# Patient Record
Sex: MALE
[De-identification: names, ages, dates, MRNs, and addresses within clinical notes are randomized per-mention and may not be internally consistent; named-entity substitution may affect disease eponyms.]

---

## 2022-07-13 ENCOUNTER — HOSPITAL ENCOUNTER (INPATIENT)
Dept: HOSPITAL 5 - ED | Age: 67
LOS: 5 days | Discharge: HOME | DRG: 100 | End: 2022-07-18
Attending: INTERNAL MEDICINE | Admitting: STUDENT IN AN ORGANIZED HEALTH CARE EDUCATION/TRAINING PROGRAM
Payer: COMMERCIAL

## 2022-07-13 DIAGNOSIS — E78.00: ICD-10-CM

## 2022-07-13 DIAGNOSIS — E78.5: ICD-10-CM

## 2022-07-13 DIAGNOSIS — E87.6: ICD-10-CM
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
## 2022-07-13 DIAGNOSIS — F10.939: ICD-10-CM

## 2022-07-13 DIAGNOSIS — E83.42: ICD-10-CM

## 2022-07-13 DIAGNOSIS — E83.51: ICD-10-CM

## 2022-07-13 DIAGNOSIS — R74.01: ICD-10-CM

## 2022-07-13 DIAGNOSIS — E43: ICD-10-CM

## 2022-07-13 DIAGNOSIS — G40.909: Primary | ICD-10-CM
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
## 2022-07-13 LAB
ALBUMIN SERPL-MCNC: 3.5 G/DL (ref 3.9–5)
ALT SERPL-CCNC: 78 UNITS/L (ref 7–56)
ANISOCYTOSIS BLD QL SMEAR: (no result)
BAND NEUTROPHILS # (MANUAL): 0 K/MM3
BILIRUB UR QL STRIP: (no result)
BLOOD UR QL VISUAL: (no result)
BUN SERPL-MCNC: 7 MG/DL (ref 9–20)
BUN/CREAT SERPL: 6 %
CALCIUM SERPL-MCNC: 8.1 MG/DL (ref 8.4–10.2)
HCT VFR BLD CALC: 36.2 % (ref 35.5–45.6)
HEMOLYSIS INDEX: 4
HGB BLD-MCNC: 12 GM/DL (ref 11.8–15.2)
MCHC RBC AUTO-ENTMCNC: 33 % (ref 32–34)
MCV RBC AUTO: 98 FL (ref 84–94)
MYELOCYTES # (MANUAL): 0 K/MM3
PH UR STRIP: 6 [PH] (ref 5–7)
PLATELET # BLD: 240 K/MM3 (ref 140–440)
PROMYELOCYTES # (MANUAL): 0 K/MM3
RBC # BLD AUTO: 3.69 M/MM3 (ref 3.65–5.03)
RBC #/AREA URNS HPF: < 1 /HPF (ref 0–6)
TOTAL CELLS COUNTED BLD: 100
UROBILINOGEN UR-MCNC: 2 MG/DL (ref ?–2)
WBC #/AREA URNS HPF: 2 /HPF (ref 0–6)

## 2022-07-13 PROCEDURE — 85007 BL SMEAR W/DIFF WBC COUNT: CPT

## 2022-07-13 PROCEDURE — 84132 ASSAY OF SERUM POTASSIUM: CPT

## 2022-07-13 PROCEDURE — G0480 DRUG TEST DEF 1-7 CLASSES: HCPCS

## 2022-07-13 PROCEDURE — 81001 URINALYSIS AUTO W/SCOPE: CPT

## 2022-07-13 PROCEDURE — 83735 ASSAY OF MAGNESIUM: CPT

## 2022-07-13 PROCEDURE — 80053 COMPREHEN METABOLIC PANEL: CPT

## 2022-07-13 PROCEDURE — 70450 CT HEAD/BRAIN W/O DYE: CPT

## 2022-07-13 PROCEDURE — 70491 CT SOFT TISSUE NECK W/DYE: CPT

## 2022-07-13 PROCEDURE — 80307 DRUG TEST PRSMV CHEM ANLYZR: CPT

## 2022-07-13 PROCEDURE — 82962 GLUCOSE BLOOD TEST: CPT

## 2022-07-13 PROCEDURE — 80320 DRUG SCREEN QUANTALCOHOLS: CPT

## 2022-07-13 PROCEDURE — 87040 BLOOD CULTURE FOR BACTERIA: CPT

## 2022-07-13 PROCEDURE — 80048 BASIC METABOLIC PNL TOTAL CA: CPT

## 2022-07-13 PROCEDURE — 76700 US EXAM ABDOM COMPLETE: CPT

## 2022-07-13 PROCEDURE — 84443 ASSAY THYROID STIM HORMONE: CPT

## 2022-07-13 PROCEDURE — 71045 X-RAY EXAM CHEST 1 VIEW: CPT

## 2022-07-13 PROCEDURE — 36415 COLL VENOUS BLD VENIPUNCTURE: CPT

## 2022-07-13 PROCEDURE — 85025 COMPLETE CBC W/AUTO DIFF WBC: CPT

## 2022-07-13 PROCEDURE — 90715 TDAP VACCINE 7 YRS/> IM: CPT

## 2022-07-13 PROCEDURE — 93005 ELECTROCARDIOGRAM TRACING: CPT

## 2022-07-13 PROCEDURE — 84100 ASSAY OF PHOSPHORUS: CPT

## 2022-07-13 NOTE — EMERGENCY DEPARTMENT REPORT
ED Seizure HPI





- General


Chief Complaint: Seizure


Stated Complaint: SEIZURE


Time Seen by Provider: 07/13/22 21:55


Source: patient, EMS


Mode of arrival: Stretcher


Limitations: No Limitations





- History of Present Illness


Initial Comments: 





66-year-old male who lives in [place], originating from [place], with self-

reported history of hypercholesterolemia, hypertension, peripheral vascular 

disease, brought in by EMS from the airport for evaluation status post seizure. 

EMS reports that the patient had a witnessed seizure by multiple other 

passengers who were boarding the patient's flight.  They state that the patient 

was having what she believes was a grand mal seizure.  Upon their arrival the 

patient was not seizing but was found to be confused and per her report, 

postictal.  She states approximately 30 minutes later the patient's mental statu

s significantly improved and he was transported to the hospital without 

incident.  He did not require any antiepileptics. Pt denies any recent or active

covid symptoms.





Patient reports he drinks alcohol chronically and for the past year and a half 

has been drinking "only 1 beer a week."  He reports he had a beer at the airport

while waiting for his flight.  He states he was traveling from [facility] to [place] and had to stop in [place] when the seizure happened.  He 

states he was feeling "fine" before having a seizure and does not recall having 

any prior symptoms or having any preceding symptoms before the seizure.  He 

denies any chest pain shortness of breath difficulty breathing or palpitations. 

No headache.  No weakness in his arms or legs.  Denies any tobacco alcohol or i

llicit drugs.  Pain currently 0/10.


MD Complaint: seizure


-: Gradual


Description of Episode: other (unknown)


Witnessed:: Yes


Trauma: No


Seizure History: none


Place: other (airport)


Possible Precipitating Event: alcohol withdrawal


Associated Symptoms: denies other symptoms


Treatments Prior to Arrival: none





- Related Data


                                    Allergies











Allergy/AdvReac Type Severity Reaction Status Date / Time


 


dactinomycin Allergy  Rash Verified 07/13/22 22:13














ED Review of Systems


ROS: 


Stated complaint: SEIZURE


Other details as noted in HPI





Comment: All other systems reviewed and negative


Constitutional: no symptoms reported


Eyes: as per HPI


ENT: as per HPI


Respiratory: no symptoms reported


Cardiovascular: as per HPI


Endocrine: no symptoms reported


Gastrointestinal: denies: abdominal pain, nausea, vomiting, diarrhea, 

constipation, hematemesis, melena, hematochezia


Genitourinary: denies: as per HPI, urgency, dysuria, frequency, hematuria, 

discharge, testicular pain, testicular mass, other


Musculoskeletal: denies: as per HPI, back pain, joint swelling, arthralgia


Skin: denies: as per HPI, rash, lesions, change in color, change in hair/nails, 

pruritus


Neurological: other (+seizure)


Psychiatric: denies: anxiety, depression, auditory hallucinations, visual 

hallucinations, homicidal thoughts, suicidal thoughts


Hematological/Lymphatic: denies: easy bleeding, easy bruising, swollen glands





ED Past Medical Hx





- Past Medical History


Previous Medical History?: Yes


Hx Hypertension: Yes


Hx Seizures: No





- Surgical History


Past Surgical History?: Yes


Additional Surgical History: vascular surgery to LLE





- Family History


Family history: no significant





- Social History


Smoking Status: Unknown if ever smoked


Substance Use Type: Alcohol





ED Physical Exam





- General


Limitations: No Limitations


General appearance: alert, in no apparent distress





- Head


Head exam: Present: atraumatic, normocephalic, normal inspection





- Eye


Eye exam: Present: normal appearance, PERRL, EOMI, other (Sclerae anicteric)





- ENT


ENT exam: Present: normal exam, normal orophraynx, mucous membranes moist, other

(Significant tongue fasciculations noted)





- Neck


Neck exam: Present: normal inspection, full ROM.  Absent: tenderness, 

meningismus, lymphadenopathy, thyromegaly





- Respiratory


Respiratory exam: Absent: normal lung sounds bilaterally, wheezes, rales, 

rhonchi, stridor, chest wall tenderness, accessory muscle use, decreased breath 

sounds, prolonged expiratory





- Cardiovascular


Cardiovascular Exam: Present: regular rate, normal rhythm, normal heart sounds





- GI/Abdominal


GI/Abdominal exam: Present: soft, normal bowel sounds.  Absent: distended, 

tenderness, guarding, rigid, diminished bowel sounds, hyperactive bowel sounds, 

hypoactive bowel sounds, organomegaly, mass, bruit, pulsatile mass, hernia





- Rectal


Rectal exam: Present: deferred





- 


External exam: Present: normal external exam, other (skin grafting scar to 

distal anterior LLE; no evidence of acute trauma or infection, scar site is 

healed dry and intact, intact distal pulses in bilateral lower extremity)





- Extremities Exam


Extremities exam: Present: full ROM, normal capillary refill.  Absent: pedal 

edema, joint swelling, calf tenderness





- Back Exam


Back exam: Present: normal inspection, full ROM.  Absent: tenderness, CVA 

tenderness (R), CVA tenderness (L), muscle spasm, paraspinal tenderness, 

vertebral tenderness





- Neurological Exam


Neurological exam: Present: alert, oriented X3, CN II-XII intact, normal gait, 

motor sensory deficit, reflexes normal





- Psychiatric


Psychiatric exam: Present: normal affect, normal mood.  Absent: depressed, 

anxious, flat affect, manic, homicidal ideation, suicidal ideation





- Skin


Skin exam: Present: warm, dry, intact, normal color





ED Course


                                   Vital Signs











  07/13/22 07/13/22 07/13/22





  21:36 22:01 22:07


 


Temperature 98 F  97.7 F


 


Pulse Rate 74 75 73


 


Respiratory 16 12 17





Rate   


 


Blood Pressure   156/77


 


Blood Pressure 131/86  156/77





[Right]   


 


O2 Sat by Pulse 98 100 100





Oximetry   














  07/13/22 07/13/22 07/13/22





  22:16 22:30 22:47


 


Temperature   


 


Pulse Rate 73 73 86


 


Respiratory 21 20 





Rate   


 


Blood Pressure 156/77 156/77 156/77


 


Blood Pressure   





[Right]   


 


O2 Sat by Pulse 100 100 84





Oximetry   














  07/13/22 07/13/22 07/13/22





  23:00 23:16 23:30


 


Temperature   


 


Pulse Rate 70 71 73


 


Respiratory 21 16 19





Rate   


 


Blood Pressure 156/77 156/77 156/77


 


Blood Pressure   





[Right]   


 


O2 Sat by Pulse 99 99 98





Oximetry   














  07/13/22 07/14/22 07/14/22





  23:46 00:00 00:16


 


Temperature   


 


Pulse Rate 72 72 86


 


Respiratory 15 21 19





Rate   


 


Blood Pressure 156/77 156/77 156/77


 


Blood Pressure   





[Right]   


 


O2 Sat by Pulse 94 98 98





Oximetry   














  07/14/22 07/14/22 07/14/22





  00:30 00:46 01:00


 


Temperature   


 


Pulse Rate 76 83 88


 


Respiratory 23 23 16





Rate   


 


Blood Pressure 156/77 156/77 156/77


 


Blood Pressure   





[Right]   


 


O2 Sat by Pulse 98 99 97





Oximetry   














  07/14/22 07/14/22 07/14/22





  01:16 01:30 01:46


 


Temperature   


 


Pulse Rate 82 72 80


 


Respiratory 32 H 24 23





Rate   


 


Blood Pressure 156/77 156/77 156/77


 


Blood Pressure   





[Right]   


 


O2 Sat by Pulse 100 100 98





Oximetry   














  07/14/22 07/14/22 07/14/22





  02:00 02:16 02:30


 


Temperature   


 


Pulse Rate 87  73


 


Respiratory 18 18 25 H





Rate   


 


Blood Pressure 156/77 156/77 138/71


 


Blood Pressure   





[Right]   


 


O2 Sat by Pulse 99 99 99





Oximetry   














  07/14/22 07/14/22 07/14/22





  02:46 03:00 03:16


 


Temperature   


 


Pulse Rate 70 78 76


 


Respiratory 22 17 24





Rate   


 


Blood Pressure 119/62 138/71 159/75


 


Blood Pressure   





[Right]   


 


O2 Sat by Pulse  97 





Oximetry   














  07/14/22 07/14/22 07/14/22





  03:30 03:46 04:00


 


Temperature   


 


Pulse Rate 86  70


 


Respiratory 23  26 H





Rate   


 


Blood Pressure 156/72 141/89 141/89


 


Blood Pressure   





[Right]   


 


O2 Sat by Pulse 95 100 100





Oximetry   














  07/14/22 07/14/22 07/14/22





  04:16 04:30 05:02


 


Temperature   


 


Pulse Rate 70 72 


 


Respiratory 26 H 22 





Rate   


 


Blood Pressure 156/78 155/86 144/64


 


Blood Pressure   





[Right]   


 


O2 Sat by Pulse 99 99 





Oximetry   














  07/14/22 07/14/22 07/14/22





  05:17 05:32 05:56


 


Temperature   


 


Pulse Rate   74


 


Respiratory   21





Rate   


 


Blood Pressure 121/55 125/65 125/65


 


Blood Pressure   





[Right]   


 


O2 Sat by Pulse   98





Oximetry   














  07/14/22





  06:00


 


Temperature 98.0 F


 


Pulse Rate 88


 


Respiratory 18





Rate 


 


Blood Pressure 


 


Blood Pressure 169/96





[Right] 


 


O2 Sat by Pulse 98





Oximetry 














- Reevaluation(s)


Reevaluation #1: 





07/13/22 23:12


pt well appearing; mentating well; no focal neurodeficits, patient is not posti

ctal and has had no recurrent seizure episodes, denies any symptoms, pain 0-10





ED Medical Decision Making





- Lab Data


Result diagrams: 


                                 07/13/22 22:28





                                 07/14/22 04:06





- EKG Data


-: EKG Interpreted by Me


EKG shows normal: sinus rhythm


Rate: normal





- EKG Data


When compared to previous EKG there are: previous EKG unavailable


Interpretation: no acute changes, normal EKG





- Medical Decision Making





66-year-old male with a history of chronic alcohol abuse brought in from the 

airport for witnessed seizure activity.  Vital signs stable.  Patient tremulous 

here with tongue fasciculations.  Although the patient repeatedly denies he 

actively abuses alcohol, he was given Ativan 1 mg IV push which resulted in 

complete resolution of his tremulousness.  While here the patient's nurse states

 that the patient had a witnessed fall while attempting to urinate.  The patient

 at the time sustained in a superficial abrasion to the right anterior aspect of

 his neck.  CT scan of the neck was performed to rule out cervical spine 

fracture as well as vascular pathology.  See patient's chart for radiologist 

impression and plan.





Patient is well-appearing here.  No further emergent work-up warranted.  Patient

 stable per my clinical assessment for discharge to home.


Critical care attestation.: 


If time is entered above; I have spent that time in minutes in the direct care 

of this critically ill patient, excluding procedure time.








ED Disposition


Clinical Impression: 


 Seizure, Neck abrasion, non-infected





Disposition: 01 HOME / SELF CARE / HOMELESS


Is pt being admited?: No


Does the pt Need Aspirin: No


Condition: Stable


Additional Instructions: 


The cause of your seizure is unclear but it may be related to alcohol intake.  

It is strongly advised that you discontinue drinking alcohol as it places you at

 risk for life-threatening health conditions.





It is strongly advised that you do not travel internationally at this time but 

rather return home as soon as possible to be evaluated by your primary care 

doctor.  You are not cleared for return to traveling internationally at this 

time given that your seizure will need further work-up by your primary care 

doctor and/or neurologist as soon as possible.





Please follow-up with your primary care doctor soon as possible for 

reassessment.  Your primary care doctor will likely need to refer you to a 

neurologist.





Apply over-the-counter bacitracin ointment to the abrasion on your neck.  Please

 do this twice a day for the next 7 to 10 days








Return to the nearest emergency department as soon as possible if you develop 

recurrent seizures, dizziness, lightheadedness, weakness, or if any other new 

worrisome symptoms develop.


Referrals: 


PRIMARY CARE,MD [Primary Care Provider] - 3-5 Days

## 2022-07-13 NOTE — CAT SCAN REPORT
CT head without contrast



INDICATION : witnessed seizure at airport; pt denies hx of seiz.



TECHNIQUE:  Axial imaging performed from the skull apex through the skull base without the use of con
trast.  All CT scans at this location are performed using CT dose reduction for ALARA by means of aut
omated exposure control. 



COMPARISON:  None



FINDINGS:  



Parenchyma: No mass, stroke or hemorrhage. Chronic changes of atrophy and small vessel ischemia. Lacu
samantha infarct right thalamus.



Ventricles:  Ventricles are normal in size and appear symmetric.   



Soft tissues:  Soft tissues including the orbits appear normal.   



Bones:  No acute osseous abnormality.   



Sinuses:  Bilateral mucosal thickening at the maxillary sinuses.



IMPRESSION:

1. Chronic changes of atrophy and small vessel ischemia.

2. Small lacunar infarct right thalamus is favored to be chronic.

3. Mild mucosal thickening at the maxillary sinuses.



Signer Name: Werner Low MD 

Signed: 7/13/2022 11:01 PM

Workstation Name: [id number]

## 2022-07-14 LAB
BUN SERPL-MCNC: 6 MG/DL (ref 9–20)
BUN/CREAT SERPL: 6 %
CALCIUM SERPL-MCNC: 8.1 MG/DL (ref 8.4–10.2)
HEMOLYSIS INDEX: 4

## 2022-07-14 RX ADMIN — DEXTROSE SCH MLS/HR: 5 SOLUTION INTRAVENOUS at 17:50

## 2022-07-14 RX ADMIN — Medication SCH ML: at 10:31

## 2022-07-14 RX ADMIN — ENOXAPARIN SODIUM SCH MG: 100 INJECTION SUBCUTANEOUS at 09:49

## 2022-07-14 RX ADMIN — Medication SCH ML: at 23:02

## 2022-07-14 NOTE — CAT SCAN REPORT
CT NECK WITH INTRAVENOUS CONTRAST AND MULTIPLANAR RECONSTRUCTION



CLINICAL HISTORY: fall, injury and lac to R anterior neck; r/o vasc 



TECHNIQUE:



2.5 mm thick contiguous axial scans were obtained from the skull base down to the aortic arch during 
intravenous contrast administration. In addition to evaluation of axial source images sagittal and co
arpita multiplanar reconstructions were produced and reviewed for this report.



CONTRAST DOSE REPORT: Omnipaque 350:  100 administered intravenously.



All CT imaging studies performed at this facility utilize dose modulation, iterative reconstruction o
r weight based dosing, if appropriate, to obtain the lowest achievable radiation dose.





FINDINGS:



AIRWAY: No abnormalities are seen along the course of the airway. Nasopharynx, oropharynx, hypopharyn
x, larynx and visualized portions of the subglottic airway all have an unremarkable appearance.



LYMPH NODES: There is no indication of cervical lymphadenopathy.



ORAL CAVITY/FLOOR OF MOUTH: No abnormalities are seen in evaluation of the oral cavity and tongue. Th
e floor the mouth has a normal appearance.



MAJOR SALIVARY GLANDS: The parotid and submandibular salivary glands have a normal appearance.



NASAL CAVITY AND PARANASAL SINUSES: Inflammatory changes are present at the basis of both maxillary s
inuses. Paranasal sinuses otherwise appear clear. Frontal sinuses did not develop in this individual.




ORBITS:No abnormalities of the visualized portions of the orbits are identified. Globes, optic nerves
, extraocular muscles and lacrimal glands have an unremarkable appearance.



THYROID GLAND: The thyroid gland is normal in size and homogeneous in attenuation. No focal thyroid l
esions are identified.



TEMPORAL BONES:Mastoid air cells are normally pneumatized.



CRANIOCERVICAL JUNCTION:No significant abnormality.



CERVICAL SPINE: Normal alignment is maintained throughout. There is no indication of traumatic sublux
ation. There is no evidence of fracture or bone destruction. Loss of disc height is noted at the C4-5
, C5-6 and C6-7 levels. Disc vacuum phenomena is present at C5-6 and C6-7 levels. Facet and uncoverte
bral arthritic changes are noted. Neuroforaminal stenosis is prominent finding at the C5, C6 and C7 n
erve root levels.



LUNG APICES: Evaluation of the lung apices reveals no abnormality. There is no indication of lung nod
ule or infiltrate. The visualized portions of the superior mediastinum have an unremarkable appearanc
e.



VASCULAR STRUCTURES: Combination of soft and calcified atherosclerotic plaque is present at the left 
carotid bifurcation without indication of hemodynamically significant stenosis. There is no evidence 
of vascular injury. Evaluation of the left subclavian artery is limited secondary to dense contrast i
n the adjacent left subclavian vein and innominate pain.



CONTRAST ADMINISTRATION: Enhancement of normal vascular structures is demonstrated. No areas of abnor
mal contrast enhancement are identified. 



IMPRESSION:





1. Widespread cervical spondylosis with multifocal neuroforaminal narrowing. No indication of cervica
l fracture or traumatic subluxation.

2. No indication of major vascular injury.



Signer Name: Isaiah Lazar MD 

Signed: 7/14/2022 5:18 AM

Workstation Name: [id number]

## 2022-07-14 NOTE — ELECTROCARDIOGRAPH REPORT
[facility]

                                       

Test Date:    2022               Test Time:    23:03:57

Pat Name:     SONIA BEYER       Department:   

Patient ID:   [id number]          Room:         [facility]

Gender:       M                        Technician:   GALEN

:          1955               Requested By: NIHARIKA MCKEON

Order Number: [id number]              Reading MD:   Rigo Loera

                                 Measurements

Intervals                              Axis          

Rate:         75                       P:            55

SD:           163                      QRS:          8

QRSD:         67                       T:            29

QT:           424                                    

QTc:          475                                    

                           Interpretive Statements

Sinus rhythm

LAE, consider biatrial enlargement

nonspecific st-t

No previous ECG available for comparison

Electronically Signed On 2022 9:18:57 EDT by Rigo Loera

## 2022-07-14 NOTE — XRAY REPORT
CHEST 1 VIEW 7/13/2022 11:07 PM



INDICATION / CLINICAL INFORMATION: seizure, weakness.



COMPARISON: None.



FINDINGS:



SUPPORT DEVICES: None.

HEART / MEDIASTINUM: No significant abnormality. 

LUNGS / PLEURA: No significant pulmonary or pleural abnormality. No pneumothorax. 



ADDITIONAL FINDINGS: No significant additional findings.



IMPRESSION: No acute abnormality.



Signer Name: Werner Low MD 

Signed: 7/14/2022 12:10 AM

Workstation Name: [id number]
#1:

## 2022-07-14 NOTE — HISTORY AND PHYSICAL REPORT
History of Present Illness


Date of examination: 07/14/22


Date of admission: 


07/14/22 08:16





Chief complaint: 





Witnessed seizure


History of present illness: 





Unable to get a complete history from the patient given his 

encephalopathy/lethargy.  The HPI listed below is from the ED 

physician/provider.








"The patient is a 66-year-old male who lives in [place], originating from 

[place], with self-reported history of hypercholesterolemia, hypertension, 

peripheral vascular disease, brought in by EMS from the airport for evaluation 

status post seizure.  EMS reports that the patient had a witnessed seizure by 

multiple other passengers who were boarding the patient's flight.  They state 

that the patient was having what she believes was a grand mal seizure.  Upon the

ir arrival the patient was not seizing but was found to be confused and per her 

report, postictal.  She states approximately 30 minutes later the patient's 

mental status significantly improved and he was transported to the hospital 

without incident.  He did not require any antiepileptics. Pt denies any recent 

or active covid symptoms.





Patient reports he drinks alcohol chronically and for the past year and a half 

has been drinking "only 1 beer a week."  He reports he had a beer at the airport

while waiting for his flight.  He states he was traveling from [facility] to [place] and had to stop in [place] when the seizure happened.  He 

states he was feeling "fine" before having a seizure and does not recall having 

any prior symptoms or having any preceding symptoms before the seizure.  He 

denies any chest pain shortness of breath difficulty breathing or palpitations. 

No headache.  No weakness in his arms or legs.  Denies any tobacco alcohol or 

illicit drugs.  Pain currently 0/10."





The patient is being admitted for management of alcohol withdrawal.





Past History


Past Medical History: hypertension, hyperlipidemia, PVD


Past Surgical History: No surgical history


Social history: alcohol abuse, full code


Family history: other (Unable to obtain information from patient)





Medications and Allergies


                                    Allergies











Allergy/AdvReac Type Severity Reaction Status Date / Time


 


dactinomycin Allergy  Rash Verified 07/13/22 22:13











Active Meds: 


Active Medications





Acetaminophen (Acetaminophen 325 Mg Tab)  650 mg PO Q4H PRN


   PRN Reason: Pain MILD(1-3)/Fever >100.5/HA


Chlordiazepoxide HCl (Chlordiazepoxide 25 Mg Cap)  50 mg PO Q3HR PRN


   PRN Reason: CIWA-Ar 8-15


   Last Admin: 07/14/22 14:21 Dose:  50 mg


   


Enoxaparin Sodium (Enoxaparin 40 Mg/0.4 Ml Inj)  40 mg SUB-Q QDAY@1000 SHIRA


   Last Admin: 07/14/22 09:49 Dose:  40 mg


   


Thiamine HCl 100 mg/ Folic Acid 1 mg/ Multivitamins/Minerals 10 ml/ Sodium Chlo

ride  1,011.2 mls @ 250 mls/hr IV ONCE ONE


   Stop: 07/14/22 15:02


   Last Admin: 07/14/22 12:09 Dose:  250 mls/hr


   


Sodium Chloride (Nacl 0.9% 1000 Ml)  1,000 mls @ 500 mls/hr IV BOLUS [facility]


   Stop: 07/14/22 23:00


Lorazepam (Lorazepam 2 Mg/Ml Vial)  4 mg IV Q15MIN PRN


   PRN Reason: CIWA-Ar >25


Lorazepam (Lorazepam 2 Mg/Ml Vial)  2 mg IV Q1HR PRN


   PRN Reason: CIWA-Ar 16-25


   Last Admin: 07/14/22 10:32 Dose:  2 mg


   


Morphine Sulfate (Morphine 2 Mg/1 Ml Inj)  2 mg IV Q4H PRN


   PRN Reason: Pain , Severe (7-10)


Ondansetron HCl (Ondansetron 4 Mg/2 Ml Inj)  4 mg IV Q8H PRN


   PRN Reason: Nausea And Vomiting


Oxycodone/Acetaminophen (Oxycodone /Acetaminophen 5-325mg Tab)  1 tab PO Q6H PRN


   PRN Reason: Pain, Moderate (4-6)


Sodium Chloride (Sodium Chloride 0.9% 10 Ml Flush Syringe)  10 ml IV BID [facility]


   Last Admin: 07/14/22 10:31 Dose:  10 ml


   


Sodium Chloride (Sodium Chloride 0.9% 10 Ml Flush Syringe)  10 ml IV PRN PRN


   PRN Reason: LINE FLUSH











Review of Systems


ROS unobtainable: due to mental status





Exam





- Constitutional


Vitals: 


                                        











Temp Pulse Resp BP Pulse Ox


 


 98.9 F   70   24   148/101   100 


 


 07/14/22 08:30  07/14/22 14:00  07/14/22 14:00  07/14/22 14:00  07/14/22 14:00











General appearance: Present: no acute distress, well-nourished, other 

(Drowsy/lethargic on exam)





- EENT


Eyes: Present: PERRL, EOM intact


ENT: hearing intact, clear oral mucosa, dentition normal





- Neck


Neck: Present: supple, normal ROM, other (Laceration of right neck (medial 

aspect))





- Respiratory


Respiratory effort: normal


Respiratory: bilateral: CTA





- Cardiovascular


Rhythm: regular


Heart Sounds: Present: S1 & S2





- Extremities


Extremities: no ischemia, pulses intact, pulses symmetrical, No edema, normal 

temperature, normal color, Full ROM


Peripheral Pulses: within normal limits





- Abdominal


General gastrointestinal: Present: soft, non-tender, distended (Mildly 

distended), normal bowel sounds


Male genitourinary: Present: deferred





- Rectal


Rectal Exam: deferred





- Integumentary


Integumentary: Present: clear (Spider angiomas on anterior upper chest), warm, 

dry





- Musculoskeletal


Musculoskeletal: strength equal bilaterally





- Psychiatric


Psychiatric: other (Unable to fully examine/assess given mental status)





- Neurologic


Neurologic: CNII-XII intact, moves all extremities





Results





- Labs


CBC & Chem 7: 


                                 07/13/22 22:28





                                 07/14/22 04:06


Labs: 


                             Laboratory Last Values











WBC  5.1 K/mm3 (4.5-11.0)   07/13/22  22:28    


 


RBC  3.69 M/mm3 (3.65-5.03)   07/13/22  22:28    


 


Hgb  12.0 gm/dl (11.8-15.2)   07/13/22  22:28    


 


Hct  36.2 % (35.5-45.6)   07/13/22  22:28    


 


MCV  98 fl (84-94)  H  07/13/22  22:28    


 


MCH  33 pg (28-32)  H  07/13/22  22:28    


 


MCHC  33 % (32-34)   07/13/22  22:28    


 


RDW  14.6 % (13.2-15.2)   07/13/22  22:28    


 


Plt Count  240 K/mm3 (140-440)   07/13/22  22:28    


 


Mono % (Auto)  Np   07/13/22  22:28    


 


Add Manual Diff  Complete   07/13/22  22:28    


 


Total Counted  100   07/13/22  22:28    


 


Seg Neuts % (Manual)  74.0 % (40.0-70.0)  H  07/13/22  22:28    


 


Band Neutrophils %  0 %  07/13/22  22:28    


 


Lymphocytes % (Manual)  17.0 % (13.4-35.0)   07/13/22  22:28    


 


Reactive Lymphs % (Man)  0 %  07/13/22  22:28    


 


Monocytes % (Manual)  8.0 % (0.0-7.3)  H  07/13/22  22:28    


 


Eosinophils % (Manual)  1.0 % (0.0-4.3)   07/13/22  22:28    


 


Basophils % (Manual)  0 % (0.0-1.8)   07/13/22  22:28    


 


Metamyelocytes %  0 %  07/13/22  22:28    


 


Myelocytes %  0 %  07/13/22 22:28    


 


Promyelocytes %  0 %  07/13/22  22:28    


 


Blast Cells %  0 %  07/13/22  22:28    


 


Nucleated RBC %  Not Reportable   07/13/22  22:28    


 


Seg Neutrophils # Man  3.8 K/mm3 (1.8-7.7)   07/13/22  22:28    


 


Band Neutrophils #  0.0 K/mm3  07/13/22  22:28    


 


Lymphocytes # (Manual)  0.9 K/mm3 (1.2-5.4)  L  07/13/22  22:28    


 


Abs React Lymphs (Man)  0.0 K/mm3  07/13/22  22:28    


 


Monocytes # (Manual)  0.4 K/mm3 (0.0-0.8)   07/13/22  22:28    


 


Eosinophils # (Manual)  0.1 K/mm3 (0.0-0.4)   07/13/22  22:28    


 


Basophils # (Manual)  0.0 K/mm3 (0.0-0.1)   07/13/22  22:28    


 


Metamyelocytes #  0.0 K/mm3  07/13/22  22:28    


 


Myelocytes #  0.0 K/mm3  07/13/22  22:28    


 


Promyelocytes #  0.0 K/mm3  07/13/22  22:28    


 


Blast Cells #  0.0 K/mm3  07/13/22  22:28    


 


WBC Morphology  Not Reportable   07/13/22  22:28    


 


Hypersegmented Neuts  Not Reportable   07/13/22  22:28    


 


Hyposegmented Neuts  Not Reportable   07/13/22  22:28    


 


Hypogranular Neuts  Not Reportable   07/13/22  22:28    


 


Smudge Cells  Few   07/13/22  22:28    


 


Toxic Granulation  Not Reportable   07/13/22  22:28    


 


Toxic Vacuolation  Not Reportable   07/13/22  22:28    


 


Dohle Bodies  Not Reportable   07/13/22  22:28    


 


Pelger-Huet Anomaly  Not Reportable   07/13/22  22:28    


 


Titus Rods  Not Reportable   07/13/22  22:28    


 


Platelet Estimate  Consistent w auto   07/13/22  22:28    


 


Clumped Platelets  Not Reportable   07/13/22  22:28    


 


Plt Clumps, EDTA  Not Reportable   07/13/22  22:28    


 


Large Platelets  Not Reportable   07/13/22  22:28    


 


Giant Platelets  Not Reportable   07/13/22  22:28    


 


Platelet Satelliting  Not Reportable   07/13/22  22:28    


 


Plt Morphology Comment  Not Reportable   07/13/22  22:28    


 


RBC Morphology  Not Reportable   07/13/22  22:28    


 


Dimorphic RBCs  Not Reportable   07/13/22  22:28    


 


Polychromasia  Not Reportable   07/13/22  22:28    


 


Hypochromasia  Not Reportable   07/13/22  22:28    


 


Poikilocytosis  Not Reportable   07/13/22  22:28    


 


Anisocytosis  1+   07/13/22  22:28    


 


Microcytosis  Not Reportable   07/13/22  22:28    


 


Macrocytosis  Not Reportable   07/13/22  22:28    


 


Spherocytes  Not Reportable   07/13/22  22:28    


 


Pappenheimer Bodies  Not Reportable   07/13/22  22:28    


 


Sickle Cells  Not Reportable   07/13/22  22:28    


 


Target Cells  Not Reportable   07/13/22  22:28    


 


Tear Drop Cells  Not Reportable   07/13/22  22:28    


 


Ovalocytes  Not Reportable   07/13/22  22:28    


 


Helmet Cells  Not Reportable   07/13/22  22:28    


 


Mcmahon-[place] Bodies  Not Reportable   07/13/22  22:28    


 


Cabot Rings  Not Reportable   07/13/22  22:28    


 


[place] Cells  Not Reportable   07/13/22  22:28    


 


Bite Cells  Not Reportable   07/13/22  22:28    


 


Crenated Cell  Not Reportable   07/13/22  22:28    


 


Elliptocytes  Not Reportable   07/13/22  22:28    


 


Acanthocytes (Spur)  Not Reportable   07/13/22  22:28    


 


Rouleaux  Not Reportable   07/13/22  22:28    


 


Hemoglobin C Crystals  Not Reportable   07/13/22  22:28    


 


Schistocytes  Not Reportable   07/13/22  22:28    


 


Malaria parasites  Not Reportable   07/13/22  22:28    


 


Marino Bodies  Not Reportable   07/13/22  22:28    


 


Hem Pathologist Commnt  No   07/13/22  22:28    


 


Sodium  134 mmol/L (137-145)  L  07/14/22  04:06    


 


Potassium  3.6 mmol/L (3.6-5.0)   07/14/22  04:06    


 


Chloride  95.3 mmol/L ()  L  07/14/22  04:06    


 


Carbon Dioxide  24 mmol/L (22-30)   07/14/22  04:06    


 


Anion Gap  18 mmol/L  07/14/22  04:06    


 


BUN  6 mg/dL (9-20)  L  07/14/22  04:06    


 


Creatinine  1.0 mg/dL (0.8-1.3)   07/14/22  04:06    


 


Estimated GFR  > 60 ml/min  07/14/22  04:06    


 


BUN/Creatinine Ratio  6 %  07/14/22  04:06    


 


Glucose  101 mg/dL ()  H  07/14/22  04:06    


 


Calcium  8.1 mg/dL (8.4-10.2)  L  07/14/22  04:06    


 


Phosphorus  1.30 mg/dL (2.5-4.5)  L  07/14/22  09:02    


 


Magnesium  1.30 mg/dL (1.7-2.3)  L  07/13/22  22:28    


 


Total Bilirubin  0.80 mg/dL (0.1-1.2)   07/13/22  22:28    


 


AST  156 units/L (5-40)  H  07/13/22  22:28    


 


ALT  78 units/L (7-56)  H  07/13/22  22:28    


 


Alkaline Phosphatase  145 units/L ()  H  07/13/22  22:28    


 


Total Protein  7.6 g/dL (6.3-8.2)   07/13/22  22:28    


 


Albumin  3.5 g/dL (3.9-5)  L  07/13/22  22:28    


 


Albumin/Globulin Ratio  0.9 %  07/13/22  22:28    


 


TSH  2.020 mlU/mL (0.270-4.200)   07/13/22  22:28    


 


Urine Color  Yellow  (Yellow)   07/13/22  Unknown


 


Urine Turbidity  Clear  (Clear)   07/13/22  Unknown


 


Urine pH  6.0  (5.0-7.0)   07/13/22  Unknown


 


Ur Specific Gravity  1.012  (1.003-1.030)   07/13/22  Unknown


 


Urine Protein  30 mg/dl mg/dL (Negative)   07/13/22  Unknown


 


Urine Glucose (UA)  Neg mg/dL (Negative)   07/13/22  Unknown


 


Urine Ketones  Tr mg/dL (Negative)   07/13/22  Unknown


 


Urine Blood  Neg  (Negative)   07/13/22  Unknown


 


Urine Nitrite  Neg  (Negative)   07/13/22  Unknown


 


Urine Bilirubin  Neg  (Negative)   07/13/22  Unknown


 


Urine Urobilinogen  2.0 mg/dL (<2.0)   07/13/22  Unknown


 


Ur Leukocyte Esterase  Neg  (Negative)   07/13/22  Unknown


 


Urine WBC (Auto)  2.0 /HPF (0.0-6.0)   07/13/22  Unknown


 


Urine RBC (Auto)  < 1.0 /HPF (0.0-6.0)   07/13/22  Unknown


 


U Epithel Cells (Auto)  < 1.0 /HPF (0-13.0)   07/13/22  Unknown


 


Urine Opiates Screen  Negative   07/13/22  Unknown


 


Urine Methadone Screen  Negative   07/13/22  Unknown


 


Ur Barbiturates Screen  Negative   07/13/22  Unknown


 


Ur Phencyclidine Scrn  Negative   07/13/22  Unknown


 


Ur Amphetamines Screen  Negative   07/13/22  Unknown


 


U Benzodiazepines Scrn  Negative   07/13/22  Unknown


 


Urine Cocaine Screen  Negative   07/13/22  Unknown


 


U Marijuana (THC) Screen  Negative   07/13/22  Unknown


 


Drugs of Abuse Note  Disclamer   07/13/22  Unknown


 


Plasma/Serum Alcohol  < 0.01 % (0-0.07)   07/13/22  22:28    














Assessment and Plan


Assessment and plan: 








#Alcohol withdrawal


#Witnessed seizure


 Patient endorses chronic alcohol consumption and currently admits to having "1

beer a day".  Seizure episode is likely secondary to alcohol withdrawal as 

opposed to a true seizure disorder.  Holding off on initiating antiepileptics.


 Initiating CIWA protocol


 Status post IV fluid resuscitation + banana bag in the ED


 We will start p.o. folic acid, thiamine, and multivitamin in the morning.





#Elevated transaminases


 , ALT 78, alkaline phosphatase 145


 Likely secondary to chronic alcohol consumption.  Pending abdominal ultrasound

to evaluate for possible cirrhosis given the patient's spider angiomas + 

abdominal distention that might be indicative for ascites.


 Continue to monitor with repeat CMP in the morning.





#Hypocalcemia


 Calcium 8.1


 Repleting.  Will repeat calcium level in the morning.





#Hypomagnesemia


 Magnesium 1.3


 Repleting.  Will repeat magnesium level in the morning.





#Mild protein caloric malnutrition


 Albumin 3.5


 We will start dietary supplementation when patient is more alert.





#Alcohol dependence


- Counseled patient on the importance of ETOH cessation. Assess patient's 

current ETOH consumption. Assisted with trying to arrange resources for patient 

to adequately work towards ETOH cessation. Patient expresses understanding. 


-Time: +15 mins





#Advanced care planning


-Disease education conducted, care plan discussed, diagnoses discussed, 

prognosis discussed, and patient acknowledges understanding with care plan


-Time: +30 min


Advance Directives: No


VTE prophylaxis?: Chemical


Plan of care discussed with patient/family: Yes

## 2022-07-14 NOTE — ULTRASOUND REPORT
ULTRASOUND ABDOMEN, COMPLETE



INDICATION / CLINICAL INFORMATION: Assess for possible cirrhosis.



COMPARISON: None available.



FINDINGS:

PANCREAS: No significant abnormality.

ABDOMINAL AORTA: No significant abnormality.

IVC: No significant abnormality.

LIVER: The liver is normal in size measuring 15.0 cm with diffusely echogenic and heterogeneous appea
daniel. Normal hepatopedal blood flow within the main portal vein.

GALLBLADDER: Hyperechoic focus in the gallbladder neck measuring approximately 9 mm could represent g
allbladder polyp versus non-shadowing stone. No significant wall thickening or pericholecystic fluid.
 

BILE DUCTS: No significant abnormality. Common bile duct measures 5 mm. 

KIDNEYS: Right: The right kidney measures 11.4 cm. No significant abnormality.  Left: The left kidney
 measures 11.1 cm. No significant abnormality.

SPLEEN: The spleen measures 10.8 cm. No significant abnormality.



FREE FLUID: None.

ADDITIONAL FINDINGS: None.



IMPRESSION:

1. Diffusely echogenic and heterogeneous appearance of the liver, most commonly seen with steatosis. 
 

2. Hyperechoic focus in the gallbladder neck measuring 9 mm could represent gallbladder polyp versus 
non-shadowing stone.



Scribed by: Elly Vizcaino RDMS, CHAPARRO, IMANI 

Scribed: 7/14/2022 2:27 PM 



 I have reviewed the images, agree with this report, and edited this report as needed.



Signer Name: Aaron Dan MD 

Signed: 7/14/2022 4:35 PM

Workstation Name: [employer]

## 2022-07-15 LAB
%HYPO/RBC NFR BLD AUTO: (no result) %
ALBUMIN SERPL-MCNC: 2.6 G/DL (ref 3.9–5)
ALT SERPL-CCNC: 56 UNITS/L (ref 7–56)
ANISOCYTOSIS BLD QL SMEAR: (no result)
BAND NEUTROPHILS # (MANUAL): 0 K/MM3
BUN SERPL-MCNC: 4 MG/DL (ref 9–20)
BUN/CREAT SERPL: 5 %
CALCIUM SERPL-MCNC: 7.6 MG/DL (ref 8.4–10.2)
HCT VFR BLD CALC: 32.7 % (ref 35.5–45.6)
HEMOLYSIS INDEX: 15
HGB BLD-MCNC: 11 GM/DL (ref 11.8–15.2)
MCHC RBC AUTO-ENTMCNC: 34 % (ref 32–34)
MCV RBC AUTO: 98 FL (ref 84–94)
MYELOCYTES # (MANUAL): 0 K/MM3
PLATELET # BLD: 205 K/MM3 (ref 140–440)
PROMYELOCYTES # (MANUAL): 0 K/MM3
RBC # BLD AUTO: 3.33 M/MM3 (ref 3.65–5.03)
TOTAL CELLS COUNTED BLD: 100

## 2022-07-15 RX ADMIN — Medication SCH MG: at 13:00

## 2022-07-15 RX ADMIN — FOLIC ACID SCH MG: 1 TABLET ORAL at 13:00

## 2022-07-15 RX ADMIN — ENOXAPARIN SODIUM SCH MG: 100 INJECTION SUBCUTANEOUS at 13:03

## 2022-07-15 RX ADMIN — Medication SCH ML: at 21:38

## 2022-07-15 RX ADMIN — Medication SCH ML: at 12:03

## 2022-07-15 RX ADMIN — MULTIVITAMIN TABLET SCH EACH: TABLET at 13:05

## 2022-07-15 NOTE — PROGRESS NOTE
Assessment and Plan


Assessment and plan: 


Severe hypokalemia;


Replenish per protocol and monitor levels





Hypomagnesemia; replenished per protocol monitor levels








#Alcohol withdrawal


#Witnessed seizure


 Patient endorses chronic alcohol consumption and currently admits to having "1

beer a day".  Seizure episode is likely secondary to alcohol withdrawal as 

opposed to a true seizure disorder.  Holding off on initiating antiepileptics.


 Initiating CIWA protocol


 Status post IV fluid resuscitation + banana bag in the ED


 We will start p.o. folic acid, thiamine, and multivitamin in the morning.





#Elevated transaminases


 , ALT 78, alkaline phosphatase 145


 Likely secondary to chronic alcohol consumption.  Pending abdominal ultrasound

to evaluate for possible cirrhosis given the patient's spider angiomas + 

abdominal distention that might be indicative for ascites.


 Continue to monitor with repeat CMP in the morning.





#Hypocalcemia


 Calcium 8.1


 Repleting.  Will repeat calcium level in the morning.





#Hypomagnesemia


 Magnesium 1.3


 Repleting.  Will repeat magnesium level in the morning.





#Mild protein caloric malnutrition


 Albumin 3.5


 We will start dietary supplementation when patient is more alert.





#Alcohol dependence


- Counseled patient on the importance of ETOH cessation. Assess patient's 

current ETOH consumption. Assisted with trying to arrange resources for patient 

to adequately work towards ETOH cessation. Patient expresses understanding. 


-Time: +15 mins





#Advanced care planning


-Disease education conducted, care plan discussed, diagnoses discussed, 

prognosis discussed, and patient acknowledges understanding with care plan


-Time: +30 min


Advance Directives: No


VTE prophylaxis?: Chemical


Plan of care discussed with patient/family: Yes





Closely monitor the patient and adjust management as needed


Plan of care reviewed with the patient and his nurse











History


Interval history: 


Seen and examined the patient at the bedside


Patient's chart and medications reviewed


Patient is confused and agitated restraint for safety


Vital signs noted








Hospitalist Physical





- Constitutional


Vitals: 


                                        











Temp Pulse Resp BP Pulse Ox


 


 98.3 F   68   18   151/68   96 


 


 07/15/22 03:19  07/15/22 03:19  07/15/22 05:03  07/15/22 03:19  07/15/22 05:03











General appearance: Present: no acute distress, well-nourished, other 

(Drowsy/lethargic on exam)





- EENT


Eyes: Present: PERRL, EOM intact





- Neck


Neck: Present: supple, normal ROM





- Respiratory


Respiratory effort: normal


Respiratory: bilateral: diminished, negative: rales, rhonchi, wheezing





- Cardiovascular


Rhythm: regular


Heart Sounds: Present: S1 & S2





- Extremities


Extremities: no ischemia, No edema





- Abdominal


General gastrointestinal: soft, non-tender, non-distended, normal bowel sounds





- Integumentary


Integumentary: Present: clear, warm





- Psychiatric


Psychiatric: other (Confused)





- Neurologic


Neurologic: moves all extremities





Results





- Labs


CBC & Chem 7: 


                                 07/15/22 04:39





                                 07/15/22 04:39


Labs: 


                             Laboratory Last Values











WBC  3.9 K/mm3 (4.5-11.0)  L  07/15/22  04:39    


 


RBC  3.33 M/mm3 (3.65-5.03)  L  07/15/22  04:39    


 


Hgb  11.0 gm/dl (11.8-15.2)  L  07/15/22  04:39    


 


Hct  32.7 % (35.5-45.6)  L  07/15/22  04:39    


 


MCV  98 fl (84-94)  H  07/15/22  04:39    


 


MCH  33 pg (28-32)  H  07/15/22  04:39    


 


MCHC  34 % (32-34)   07/15/22  04:39    


 


RDW  14.7 % (13.2-15.2)   07/15/22  04:39    


 


Plt Count  205 K/mm3 (140-440)   07/15/22  04:39    


 


Mono % (Auto)  Np   07/15/22  04:39    


 


Add Manual Diff  Complete   07/15/22  04:39    


 


Total Counted  100   07/15/22  04:39    


 


Seg Neuts % (Manual)  56.0 % (40.0-70.0)   07/15/22  04:39    


 


Band Neutrophils %  0 %  07/15/22  04:39    


 


Lymphocytes % (Manual)  27.0 % (13.4-35.0)   07/15/22  04:39    


 


Reactive Lymphs % (Man)  0 %  07/15/22  04:39    


 


Monocytes % (Manual)  17.0 % (0.0-7.3)  H  07/15/22  04:39    


 


Eosinophils % (Manual)  0 % (0.0-4.3)   07/15/22  04:39    


 


Basophils % (Manual)  0 % (0.0-1.8)   07/15/22  04:39    


 


Metamyelocytes %  0 %  07/15/22  04:39    


 


Myelocytes %  0 %  07/15/22  04:39    


 


Promyelocytes %  0 %  07/15/22  04:39    


 


Blast Cells %  0 %  07/15/22  04:39    


 


Nucleated RBC %  Not Reportable   07/15/22  04:39    


 


Seg Neutrophils # Man  2.2 K/mm3 (1.8-7.7)   07/15/22  04:39    


 


Band Neutrophils #  0.0 K/mm3  07/15/22  04:39    


 


Lymphocytes # (Manual)  1.1 K/mm3 (1.2-5.4)  L  07/15/22  04:39    


 


Abs React Lymphs (Man)  0.0 K/mm3  07/15/22  04:39    


 


Monocytes # (Manual)  0.7 K/mm3 (0.0-0.8)   07/15/22  04:39    


 


Eosinophils # (Manual)  0.0 K/mm3 (0.0-0.4)   07/15/22  04:39    


 


Basophils # (Manual)  0.0 K/mm3 (0.0-0.1)   07/15/22  04:39    


 


Metamyelocytes #  0.0 K/mm3  07/15/22  04:39    


 


Myelocytes #  0.0 K/mm3  07/15/22  04:39    


 


Promyelocytes #  0.0 K/mm3  07/15/22  04:39    


 


Blast Cells #  0.0 K/mm3  07/15/22  04:39    


 


WBC Morphology  Not Reportable   07/15/22  04:39    


 


Hypersegmented Neuts  Not Reportable   07/15/22  04:39    


 


Hyposegmented Neuts  Not Reportable   07/15/22  04:39    


 


Hypogranular Neuts  Not Reportable   07/15/22  04:39    


 


Smudge Cells  Not Reportable   07/15/22  04:39    


 


Toxic Granulation  Not Reportable   07/15/22  04:39    


 


Toxic Vacuolation  Not Reportable   07/15/22  04:39    


 


Dohle Bodies  Not Reportable   07/15/22  04:39    


 


Pelger-Huet Anomaly  Not Reportable   07/15/22  04:39    


 


Titus Rods  Not Reportable   07/15/22  04:39    


 


Platelet Estimate  Consistent w auto   07/15/22  04:39    


 


Clumped Platelets  Not Reportable   07/15/22  04:39    


 


Plt Clumps, EDTA  Not Reportable   07/15/22  04:39    


 


Large Platelets  Not Reportable   07/15/22  04:39    


 


Giant Platelets  Not Reportable   07/15/22  04:39    


 


Platelet Satelliting  Not Reportable   07/15/22  04:39    


 


Plt Morphology Comment  Not Reportable   07/15/22  04:39    


 


RBC Morphology  Not Reportable   07/15/22  04:39    


 


Dimorphic RBCs  Not Reportable   07/15/22  04:39    


 


Polychromasia  Not Reportable   07/15/22  04:39    


 


Hypochromasia  1+   07/15/22  04:39    


 


Poikilocytosis  Not Reportable   07/15/22  04:39    


 


Anisocytosis  1+   07/15/22  04:39    


 


Microcytosis  Not Reportable   07/15/22  04:39    


 


Macrocytosis  Few   07/15/22  04:39    


 


Spherocytes  Not Reportable   07/15/22  04:39    


 


Pappenheimer Bodies  Not Reportable   07/15/22  04:39    


 


Sickle Cells  Not Reportable   07/15/22  04:39    


 


Target Cells  Not Reportable   07/15/22  04:39    


 


Tear Drop Cells  Not Reportable   07/15/22  04:39    


 


Ovalocytes  Not Reportable   07/15/22  04:39    


 


Helmet Cells  Not Reportable   07/15/22  04:39    


 


Mcmahon-Friend Bodies  Not Reportable   07/15/22  04:39    


 


Cabot Rings  Not Reportable   07/15/22  04:39    


 


London Cells  Not Reportable   07/15/22  04:39    


 


Bite Cells  Not Reportable   07/15/22  04:39    


 


Crenated Cell  Not Reportable   07/15/22  04:39    


 


Elliptocytes  Not Reportable   07/15/22  04:39    


 


Acanthocytes (Spur)  Not Reportable   07/15/22  04:39    


 


Rouleaux  Not Reportable   07/15/22  04:39    


 


Hemoglobin C Crystals  Not Reportable   07/15/22  04:39    


 


Schistocytes  Not Reportable   07/15/22  04:39    


 


Malaria parasites  Not Reportable   07/15/22  04:39    


 


Marino Bodies  Not Reportable   07/15/22  04:39    


 


Hem Pathologist Commnt  No   07/15/22  04:39    


 


Sodium  143 mmol/L (137-145)  D 07/15/22  04:39    


 


Potassium  2.8 mmol/L (3.6-5.0)  L* D 07/15/22  04:39    


 


Chloride  105.3 mmol/L ()   07/15/22  04:39    


 


Carbon Dioxide  25 mmol/L (22-30)   07/15/22  04:39    


 


Anion Gap  16 mmol/L  07/15/22  04:39    


 


BUN  4 mg/dL (9-20)  L  07/15/22  04:39    


 


Creatinine  0.8 mg/dL (0.8-1.3)   07/15/22  04:39    


 


Estimated GFR  > 60 ml/min  07/15/22  04:39    


 


BUN/Creatinine Ratio  5 %  07/15/22  04:39    


 


Glucose  79 mg/dL ()   07/15/22  04:39    


 


POC Glucose  77 mg/dL ()   07/14/22  22:24    


 


Calcium  7.6 mg/dL (8.4-10.2)  L  07/15/22  04:39    


 


Phosphorus  3.40 mg/dL (2.5-4.5)  D 07/15/22  04:39    


 


Magnesium  1.20 mg/dL (1.7-2.3)  L  07/15/22  04:39    


 


Total Bilirubin  0.80 mg/dL (0.1-1.2)   07/15/22  04:39    


 


AST  105 units/L (5-40)  H  07/15/22  04:39    


 


ALT  56 units/L (7-56)   07/15/22  04:39    


 


Alkaline Phosphatase  122 units/L ()   07/15/22  04:39    


 


Total Protein  6.1 g/dL (6.3-8.2)  L  07/15/22  04:39    


 


Albumin  2.6 g/dL (3.9-5)  L  07/15/22  04:39    


 


Albumin/Globulin Ratio  0.7 %  07/15/22  04:39    


 


TSH  2.020 mlU/mL (0.270-4.200)   07/13/22  22:28    


 


Urine Color  Yellow  (Yellow)   07/13/22  Unknown


 


Urine Turbidity  Clear  (Clear)   07/13/22  Unknown


 


Urine pH  6.0  (5.0-7.0)   07/13/22  Unknown


 


Ur Specific Gravity  1.012  (1.003-1.030)   07/13/22  Unknown


 


Urine Protein  30 mg/dl mg/dL (Negative)   07/13/22  Unknown


 


Urine Glucose (UA)  Neg mg/dL (Negative)   07/13/22  Unknown


 


Urine Ketones  Tr mg/dL (Negative)   07/13/22  Unknown


 


Urine Blood  Neg  (Negative)   07/13/22  Unknown


 


Urine Nitrite  Neg  (Negative)   07/13/22  Unknown


 


Urine Bilirubin  Neg  (Negative)   07/13/22  Unknown


 


Urine Urobilinogen  2.0 mg/dL (<2.0)   07/13/22  Unknown


 


Ur Leukocyte Esterase  Neg  (Negative)   07/13/22  Unknown


 


Urine WBC (Auto)  2.0 /HPF (0.0-6.0)   07/13/22  Unknown


 


Urine RBC (Auto)  < 1.0 /HPF (0.0-6.0)   07/13/22  Unknown


 


U Epithel Cells (Auto)  < 1.0 /HPF (0-13.0)   07/13/22  Unknown


 


Urine Opiates Screen  Negative   07/13/22  Unknown


 


Urine Methadone Screen  Negative   07/13/22  Unknown


 


Ur Barbiturates Screen  Negative   07/13/22  Unknown


 


Ur Phencyclidine Scrn  Negative   07/13/22  Unknown


 


Ur Amphetamines Screen  Negative   07/13/22  Unknown


 


U Benzodiazepines Scrn  Negative   07/13/22  Unknown


 


Urine Cocaine Screen  Negative   07/13/22  Unknown


 


U Marijuana (THC) Screen  Negative   07/13/22  Unknown


 


Drugs of Abuse Note  Disclamer   07/13/22  Unknown


 


Plasma/Serum Alcohol  < 0.01 % (0-0.07)   07/13/22  22:28    











Microbiology: 


Microbiology





07/14/22 22:37   Peripheral/Venous   Blood Culture - Preliminary


                            Culture in Progress


07/14/22 22:37   Peripheral/Venous   Blood Culture - Preliminary


                            Culture in Progress








Jose/IV: 


                                        





Voiding Method                   Condom Catheter











Active Medications





- Current Medications


Current Medications: 














Generic Name Dose Route Start Last Admin





  Trade Name Freq  PRN Reason Stop Dose Admin


 


Acetaminophen  650 mg  07/14/22 09:00 





  Acetaminophen 325 Mg Tab  PO  





  Q4H PRN  





  Pain MILD(1-3)/Fever >100.5/HA  


 


Chlordiazepoxide HCl  50 mg  07/14/22 09:00  07/15/22 08:05





  Chlordiazepoxide 25 Mg Cap  PO   50 mg





  Q3HR PRN   Administration





  CIWA-Ar 8-15  


 


Enoxaparin Sodium  40 mg  07/14/22 10:00  07/14/22 09:49





  Enoxaparin 40 Mg/0.4 Ml Inj  SUB-Q   40 mg





  QDAY@1000 SHIRA   Administration


 


Folic Acid  1 mg  07/15/22 10:00 





  Folic Acid 1 Mg Tab  PO  





  QDAY SHIRA  


 


Dextrose  1,000 mls @ 75 mls/hr  07/14/22 18:00  07/14/22 17:50





  D5w  IV   75 mls/hr





  AS DIRECT SHIRA   Administration


 


Lorazepam  4 mg  07/14/22 09:00 





  Lorazepam 2 Mg/Ml Vial  IV  





  Q15MIN PRN  





  CIWA-Ar >25  


 


Lorazepam  2 mg  07/14/22 09:00  07/14/22 19:51





  Lorazepam 2 Mg/Ml Vial  IV   2 mg





  Q1HR PRN   Administration





  CIWA-Ar 16-25  


 


Morphine Sulfate  2 mg  07/14/22 09:00 





  Morphine 2 Mg/1 Ml Inj  IV  





  Q4H PRN  





  Pain , Severe (7-10)  


 


Multivitamins  1 each  07/15/22 10:00 





  Multivitamins ,Therapeutic Tab  PO  





  QDAY [facility]  


 


Ondansetron HCl  4 mg  07/14/22 09:00 





  Ondansetron 4 Mg/2 Ml Inj  IV  





  Q8H PRN  





  Nausea And Vomiting  


 


Oxycodone/Acetaminophen  1 tab  07/14/22 09:00 





  Oxycodone /Acetaminophen 5-325mg Tab  PO  





  Q6H PRN  





  Pain, Moderate (4-6)  


 


Potassium Chloride  40 meq  07/15/22 11:30 





  Potassium Chloride Er 20 Meq Tab  PO  07/15/22 13:00 





  ONCE NR  


 


Sodium Chloride  10 ml  07/14/22 10:00  07/14/22 23:02





  Sodium Chloride 0.9% 10 Ml Flush Syringe  IV   10 ml





  BID SHIRA   Administration


 


Sodium Chloride  10 ml  07/14/22 09:00 





  Sodium Chloride 0.9% 10 Ml Flush Syringe  IV  





  PRN PRN  





  LINE FLUSH  


 


Thiamine HCl  100 mg  07/15/22 10:00 





  Thiamine 100 Mg Tab  PO  





  QDAY SHIRA

## 2022-07-16 RX ADMIN — Medication SCH: at 22:40

## 2022-07-16 RX ADMIN — DEXTROSE SCH MLS/HR: 5 SOLUTION INTRAVENOUS at 12:30

## 2022-07-16 RX ADMIN — FOLIC ACID SCH MG: 1 TABLET ORAL at 10:06

## 2022-07-16 RX ADMIN — Medication SCH ML: at 10:09

## 2022-07-16 RX ADMIN — Medication SCH MG: at 10:06

## 2022-07-16 RX ADMIN — ENOXAPARIN SODIUM SCH MG: 100 INJECTION SUBCUTANEOUS at 10:09

## 2022-07-16 RX ADMIN — MULTIVITAMIN TABLET SCH EACH: TABLET at 10:06

## 2022-07-16 NOTE — PROGRESS NOTE
Assessment and Plan


Assessment and plan: 


Severe hypokalemia; resolved





Hypomagnesemia; replenished per protocol monitor levels








#Alcohol withdrawal;


Symptoms resolved, no tremulousness or agitation no seizures





#Witnessed seizure;


No new episodes of seizures


Patient ambulating well in the room


Ambulating in the hallway/PT if needed


Alcohol withdrawal symptoms resolved


DC CIWA protocol





#Elevated transaminases; 


trending down to near normal


Closely monitor





#Hypocalcemia


 Calcium 8.1


 Repleting.  Will repeat calcium level in the morning.





#Hypomagnesemia


Replenished, normal levels today





#Severe protein caloric malnutrition/hypoalbuminemia


Due to chronic alcoholism, thiamine folic acid and multivitamins


Quit alcohol intake





#Alcohol dependence


- Counseled patient on the importance of ETOH cessation. 


Assess patient's current ETOH consumption. 


Assisted with trying to arrange resources for patient to adequately work towards

ETOH cessation.


Patient advised to seek alcohol rehabilitation upon discharge patient verbalized

understanding


Preventive counseling and medical care 35 minutes





#Advanced care planning


-Disease education conducted, care plan discussed, 


diagnoses discussed, prognosis discussed, and patient acknowledges understanding

with care plaN


-Time: 32





Advance Directives: No


VTE prophylaxis?: Chemical


Plan of care discussed with patient/family: Yes





Closely monitor the patient and adjust management as needed


Plan of care reviewed with the patient and his nurse


Increase ambulation as tolerated


Possible discharge home tomorrow





Plan of care reviewed with the patient and his nurse





7/16/2022; ambulate as tolerated, DC CIWA protocol


Possible discharge tomorrow if stable











History


Interval history: 


I have seen and examined the patient at the bedside 


patient's chart and medications reviewed


No new events reported by the nursing


Patient is not requiring any Ativan


Complains of mild weakness


No agitation or tremulousness


Vital signs noted





Hospitalist Physical





- Constitutional


Vitals: 


                                        











Temp Pulse Resp BP Pulse Ox


 


 98.2 F   78   18   119/54   92 


 


 07/16/22 04:50  07/16/22 04:50  07/16/22 04:50  07/16/22 04:50  07/16/22 04:50











General appearance: Present: no acute distress, well-nourished, other 

(Drowsy/lethargic on exam)





- EENT


Eyes: Present: PERRL, EOM intact





- Neck


Neck: Present: supple, normal ROM





- Respiratory


Respiratory effort: normal


Respiratory: bilateral: diminished, negative: rales, rhonchi, wheezing





- Cardiovascular


Rhythm: regular


Heart Sounds: Present: S1 & S2





- Extremities


Extremities: no ischemia, No edema





- Abdominal


General gastrointestinal: soft, non-tender, non-distended, normal bowel sounds





- Integumentary


Integumentary: Present: clear, warm





- Psychiatric


Psychiatric: appropriate mood/affect, cooperative





- Neurologic


Neurologic: moves all extremities





Results





- Labs


CBC & Chem 7: 


                                 07/15/22 04:39





                                 07/16/22 15:38


Labs: 


                             Laboratory Last Values











WBC  3.9 K/mm3 (4.5-11.0)  L  07/15/22  04:39    


 


RBC  3.33 M/mm3 (3.65-5.03)  L  07/15/22  04:39    


 


Hgb  11.0 gm/dl (11.8-15.2)  L  07/15/22  04:39    


 


Hct  32.7 % (35.5-45.6)  L  07/15/22  04:39    


 


MCV  98 fl (84-94)  H  07/15/22  04:39    


 


MCH  33 pg (28-32)  H  07/15/22  04:39    


 


MCHC  34 % (32-34)   07/15/22  04:39    


 


RDW  14.7 % (13.2-15.2)   07/15/22  04:39    


 


Plt Count  205 K/mm3 (140-440)   07/15/22  04:39    


 


Mono % (Auto)  Np   07/15/22  04:39    


 


Add Manual Diff  Complete   07/15/22  04:39    


 


Total Counted  100   07/15/22  04:39    


 


Seg Neuts % (Manual)  56.0 % (40.0-70.0)   07/15/22  04:39    


 


Band Neutrophils %  0 %  07/15/22  04:39    


 


Lymphocytes % (Manual)  27.0 % (13.4-35.0)   07/15/22  04:39    


 


Reactive Lymphs % (Man)  0 %  07/15/22  04:39    


 


Monocytes % (Manual)  17.0 % (0.0-7.3)  H  07/15/22  04:39    


 


Eosinophils % (Manual)  0 % (0.0-4.3)   07/15/22  04:39    


 


Basophils % (Manual)  0 % (0.0-1.8)   07/15/22  04:39    


 


Metamyelocytes %  0 %  07/15/22  04:39    


 


Myelocytes %  0 %  07/15/22  04:39    


 


Promyelocytes %  0 %  07/15/22  04:39    


 


Blast Cells %  0 %  07/15/22  04:39    


 


Nucleated RBC %  Not Reportable   07/15/22  04:39    


 


Seg Neutrophils # Man  2.2 K/mm3 (1.8-7.7)   07/15/22  04:39    


 


Band Neutrophils #  0.0 K/mm3  07/15/22  04:39    


 


Lymphocytes # (Manual)  1.1 K/mm3 (1.2-5.4)  L  07/15/22  04:39    


 


Abs React Lymphs (Man)  0.0 K/mm3  07/15/22  04:39    


 


Monocytes # (Manual)  0.7 K/mm3 (0.0-0.8)   07/15/22  04:39    


 


Eosinophils # (Manual)  0.0 K/mm3 (0.0-0.4)   07/15/22  04:39    


 


Basophils # (Manual)  0.0 K/mm3 (0.0-0.1)   07/15/22  04:39    


 


Metamyelocytes #  0.0 K/mm3  07/15/22  04:39    


 


Myelocytes #  0.0 K/mm3  07/15/22  04:39    


 


Promyelocytes #  0.0 K/mm3  07/15/22  04:39    


 


Blast Cells #  0.0 K/mm3  07/15/22  04:39    


 


WBC Morphology  Not Reportable   07/15/22  04:39    


 


Hypersegmented Neuts  Not Reportable   07/15/22  04:39    


 


Hyposegmented Neuts  Not Reportable   07/15/22  04:39    


 


Hypogranular Neuts  Not Reportable   07/15/22  04:39    


 


Smudge Cells  Not Reportable   07/15/22  04:39    


 


Toxic Granulation  Not Reportable   07/15/22  04:39    


 


Toxic Vacuolation  Not Reportable   07/15/22  04:39    


 


Dohle Bodies  Not Reportable   07/15/22  04:39    


 


Pelger-Huet Anomaly  Not Reportable   07/15/22  04:39    


 


Titus Rods  Not Reportable   07/15/22  04:39    


 


Platelet Estimate  Consistent w auto   07/15/22  04:39    


 


Clumped Platelets  Not Reportable   07/15/22  04:39    


 


Plt Clumps, EDTA  Not Reportable   07/15/22  04:39    


 


Large Platelets  Not Reportable   07/15/22  04:39    


 


Giant Platelets  Not Reportable   07/15/22  04:39    


 


Platelet Satelliting  Not Reportable   07/15/22  04:39    


 


Plt Morphology Comment  Not Reportable   07/15/22  04:39    


 


RBC Morphology  Not Reportable   07/15/22  04:39    


 


Dimorphic RBCs  Not Reportable   07/15/22  04:39    


 


Polychromasia  Not Reportable   07/15/22  04:39    


 


Hypochromasia  1+   07/15/22  04:39    


 


Poikilocytosis  Not Reportable   07/15/22  04:39    


 


Anisocytosis  1+   07/15/22  04:39    


 


Microcytosis  Not Reportable   07/15/22  04:39    


 


Macrocytosis  Few   07/15/22  04:39    


 


Spherocytes  Not Reportable   07/15/22  04:39    


 


Pappenheimer Bodies  Not Reportable   07/15/22  04:39    


 


Sickle Cells  Not Reportable   07/15/22  04:39    


 


Target Cells  Not Reportable   07/15/22  04:39    


 


Tear Drop Cells  Not Reportable   07/15/22  04:39    


 


Ovalocytes  Not Reportable   07/15/22  04:39    


 


Helmet Cells  Not Reportable   07/15/22  04:39    


 


Mcmahon-Lakeshire Bodies  Not Reportable   07/15/22  04:39    


 


Cabot Rings  Not Reportable   07/15/22  04:39    


 


Juhi Cells  Not Reportable   07/15/22  04:39    


 


Bite Cells  Not Reportable   07/15/22  04:39    


 


Crenated Cell  Not Reportable   07/15/22  04:39    


 


Elliptocytes  Not Reportable   07/15/22  04:39    


 


Acanthocytes (Spur)  Not Reportable   07/15/22  04:39    


 


Rouleaux  Not Reportable   07/15/22  04:39    


 


Hemoglobin C Crystals  Not Reportable   07/15/22  04:39    


 


Schistocytes  Not Reportable   07/15/22  04:39    


 


Malaria parasites  Not Reportable   07/15/22  04:39    


 


Marino Bodies  Not Reportable   07/15/22  04:39    


 


Hem Pathologist Commnt  No   07/15/22  04:39    


 


Sodium  143 mmol/L (137-145)  D 07/15/22  04:39    


 


Potassium  2.8 mmol/L (3.6-5.0)  L* D 07/15/22  04:39    


 


Chloride  105.3 mmol/L ()   07/15/22  04:39    


 


Carbon Dioxide  25 mmol/L (22-30)   07/15/22  04:39    


 


Anion Gap  16 mmol/L  07/15/22  04:39    


 


BUN  4 mg/dL (9-20)  L  07/15/22  04:39    


 


Creatinine  0.8 mg/dL (0.8-1.3)   07/15/22  04:39    


 


Estimated GFR  > 60 ml/min  07/15/22  04:39    


 


BUN/Creatinine Ratio  5 %  07/15/22  04:39    


 


Glucose  79 mg/dL ()   07/15/22  04:39    


 


POC Glucose  77 mg/dL ()   07/14/22  22:24    


 


Calcium  7.6 mg/dL (8.4-10.2)  L  07/15/22  04:39    


 


Phosphorus  3.40 mg/dL (2.5-4.5)  D 07/15/22  04:39    


 


Magnesium  1.20 mg/dL (1.7-2.3)  L  07/15/22  04:39    


 


Total Bilirubin  0.80 mg/dL (0.1-1.2)   07/15/22  04:39    


 


AST  105 units/L (5-40)  H  07/15/22  04:39    


 


ALT  56 units/L (7-56)   07/15/22  04:39    


 


Alkaline Phosphatase  122 units/L ()   07/15/22  04:39    


 


Total Protein  6.1 g/dL (6.3-8.2)  L  07/15/22  04:39    


 


Albumin  2.6 g/dL (3.9-5)  L  07/15/22  04:39    


 


Albumin/Globulin Ratio  0.7 %  07/15/22  04:39    


 


TSH  2.020 mlU/mL (0.270-4.200)   07/13/22  22:28    


 


Urine Color  Yellow  (Yellow)   07/13/22  Unknown


 


Urine Turbidity  Clear  (Clear)   07/13/22  Unknown


 


Urine pH  6.0  (5.0-7.0)   07/13/22  Unknown


 


Ur Specific Gravity  1.012  (1.003-1.030)   07/13/22  Unknown


 


Urine Protein  30 mg/dl mg/dL (Negative)   07/13/22  Unknown


 


Urine Glucose (UA)  Neg mg/dL (Negative)   07/13/22  Unknown


 


Urine Ketones  Tr mg/dL (Negative)   07/13/22  Unknown


 


Urine Blood  Neg  (Negative)   07/13/22  Unknown


 


Urine Nitrite  Neg  (Negative)   07/13/22  Unknown


 


Urine Bilirubin  Neg  (Negative)   07/13/22  Unknown


 


Urine Urobilinogen  2.0 mg/dL (<2.0)   07/13/22  Unknown


 


Ur Leukocyte Esterase  Neg  (Negative)   07/13/22  Unknown


 


Urine WBC (Auto)  2.0 /HPF (0.0-6.0)   07/13/22  Unknown


 


Urine RBC (Auto)  < 1.0 /HPF (0.0-6.0)   07/13/22  Unknown


 


U Epithel Cells (Auto)  < 1.0 /HPF (0-13.0)   07/13/22  Unknown


 


Urine Opiates Screen  Negative   07/13/22  Unknown


 


Urine Methadone Screen  Negative   07/13/22  Unknown


 


Ur Barbiturates Screen  Negative   07/13/22  Unknown


 


Ur Phencyclidine Scrn  Negative   07/13/22  Unknown


 


Ur Amphetamines Screen  Negative   07/13/22  Unknown


 


U Benzodiazepines Scrn  Negative   07/13/22  Unknown


 


Urine Cocaine Screen  Negative   07/13/22  Unknown


 


U Marijuana (THC) Screen  Negative   07/13/22  Unknown


 


Drugs of Abuse Note  Disclamer   07/13/22  Unknown


 


Plasma/Serum Alcohol  < 0.01 % (0-0.07)   07/13/22  22:28    











Microbiology: 


Microbiology





07/14/22 22:37   Peripheral/Venous   Blood Culture - Preliminary


                            NO GROWTH AFTER 24 HOURS


07/14/22 22:37   Peripheral/Venous   Blood Culture - Preliminary


                            NO GROWTH AFTER 24 HOURS








Jose/IV: 


                                        





Voiding Method                   Condom Catheter











Active Medications





- Current Medications


Current Medications: 














Generic Name Dose Route Start Last Admin





  Trade Name Freq  PRN Reason Stop Dose Admin


 


Acetaminophen  650 mg  07/14/22 09:00 





  Acetaminophen 325 Mg Tab  PO  





  Q4H PRN  





  Pain MILD(1-3)/Fever >100.5/HA  


 


Chlordiazepoxide HCl  50 mg  07/14/22 09:00  07/15/22 08:05





  Chlordiazepoxide 25 Mg Cap  PO   50 mg





  Q3HR PRN   Administration





  CIWA-Ar 8-15  


 


Enoxaparin Sodium  40 mg  07/14/22 10:00  07/16/22 10:09





  Enoxaparin 40 Mg/0.4 Ml Inj  SUB-Q   40 mg





  QDAY@1000 SHIRA   Administration


 


Folic Acid  1 mg  07/15/22 10:00  07/16/22 10:06





  Folic Acid 1 Mg Tab  PO   1 mg





  QDAY SHIRA   Administration


 


Dextrose  1,000 mls @ 75 mls/hr  07/14/22 18:00  07/14/22 17:50





  D5w  IV   75 mls/hr





  AS DIRECT SHIRA   Administration


 


Lorazepam  4 mg  07/14/22 09:00 





  Lorazepam 2 Mg/Ml Vial  IV  





  Q15MIN PRN  





  CIWA-Ar >25  


 


Lorazepam  2 mg  07/14/22 09:00  07/15/22 21:39





  Lorazepam 2 Mg/Ml Vial  IV   2 mg





  Q1HR PRN   Administration





  CIWA-Ar 16-25  


 


Morphine Sulfate  2 mg  07/14/22 09:00 





  Morphine 2 Mg/1 Ml Inj  IV  





  Q4H PRN  





  Pain , Severe (7-10)  


 


Multivitamins  1 each  07/15/22 10:00  07/16/22 10:06





  Multivitamins ,Therapeutic Tab  PO   1 each





  QDAY SHIRA   Administration


 


Ondansetron HCl  4 mg  07/14/22 09:00 





  Ondansetron 4 Mg/2 Ml Inj  IV  





  Q8H PRN  





  Nausea And Vomiting  


 


Oxycodone/Acetaminophen  1 tab  07/14/22 09:00 





  Oxycodone /Acetaminophen 5-325mg Tab  PO  





  Q6H PRN  





  Pain, Moderate (4-6)  


 


Sodium Chloride  10 ml  07/14/22 10:00  07/16/22 10:09





  Sodium Chloride 0.9% 10 Ml Flush Syringe  IV   10 ml





  BID SHIRA   Administration


 


Sodium Chloride  10 ml  07/14/22 09:00 





  Sodium Chloride 0.9% 10 Ml Flush Syringe  IV  





  PRN PRN  





  LINE FLUSH  


 


Thiamine HCl  100 mg  07/15/22 10:00  07/16/22 10:06





  Thiamine 100 Mg Tab  PO   100 mg





  QDAY SHIRA   Administration














Nutrition/Malnutrition Assess





- Dietary Evaluation


Nutrition/Malnutrition Findings: 


                                        





Nutrition Notes                                            Start:  07/15/22 

18:09


Freq:                                                      Status: Active       




Protocol:                                                                       




 Document     07/15/22 18:09  LAUREL  (Rec: 07/15/22 18:28  LAUREL  [id number])


 Nutrition Notes


     Need for Assessment generated from:         MD Order


     Initial or Follow up                        Assessment


     Current Diagnosis                           Hypertension,Malnutrition,


                                                 Hyperlipidemia


     Other Pertinent Diagnosis                   PVD, Transaminitis, Seizure,


                                                 EtOH Dependence, Hypokalemia.


     Current Diet                                Regular Diet (since L 07/14).


     Labs/Tests                                  07/15: K 2.8, BUN 4, Ca 7.6,


                                                 Mg 1.2.


     Pertinent Medications                       07/15: D5w @ 75 ml/hr, Folic


                                                 acid, Multivitamins, Thiamine,


                                                 others nutritionally


                                                 unremarkable.


     Height                                      5 ft 8 in


     Weight                                      72.7 kg


     Ideal Body Weight (kg)                      70.00


     BMI                                         24.3


     Intake Prior to Admission                   Good


     Weight change and time frame                Pt denies having loss body


                                                 weight PTA.


     Weight Status                               Appropriate


     Subjective/Other Information                RD consult for dietary


                                                 supplementation assessment.


                                                 No reports available on Pt's


                                                 PO intake of meals at the time


                                                 , will assess at F/U.


                                                 I will not prescribe dietary


                                                 supplements at this time since


                                                 Pt seems to be


                                                 Encephalopathic and I strongly


                                                 recommend hold for Pt's


                                                 recovery and SLP evaluation/


                                                 recommendations.


                                                 Pt is on Room Air, O2


                                                 saturation @ 96%, according to


                                                 Physical Assessment History


                                                 notes.


                                                 Pt presents unspecified


                                                 bruises as signs of concern


                                                 for skin risk at the time,


                                                 according to Physical


                                                 Assessment History notes.


     Percent of energy/protein needs met:        Prescribed Regular Diet


                                                 provides for energy/protein


                                                 needs (2,289 Kcal/89 g) during


                                                 LOS.


     Burn                                        Absent


     Trauma                                      Absent


     GI Symptoms                                 None


     Food Allergy                                No


     Skin Integrity/Comment                      Unspecified Bruises.


     Current % PO                                Other


     Minimum of two criteria                     No


     Fluid Accumulation                          N/A


     Reduced  Strength                       N/A (non-severe)


     Protein-Calorie Malnutrition                N\A


     #1


      Nutrition Diagnosis                        No nutrition diagnosis at this


                                                 time


      Comments:                                  Will assess Pt's PO intake of


                                                 meals and SLP recommendations


                                                 at F/U


     Is patient on ventilator?                   No


     Is Patient Ambulatory and/or Out of Bed     No


     REE-([facility]-confined to bed)      [phone]


     Kcal/Kg value to use for calculation        25


     Approximate Energy Requirements Using       1818


      kcal/Kg                                    


     Calculation Used for Recommendations        Kcal/kg


     Additional Notes                            Protein: 1-1.2 g/Kg ABW; 73-88


                                                 g/day.


                                                 Fluids: 1 ml/Kcal, or as per


                                                 MD.


 Nutrition Intervention


     Change Diet Order:                          Continue Regular Diet as


                                                 tolerated.


     Follow-Up By:                               07/19/22


     Additional Comments                         Continue monitoring food


                                                 tolerance, %PO intake of meals


                                                 , and BM.

## 2022-07-16 NOTE — DISCHARGE SUMMARY
Providers





- Providers


Date of Admission: 


07/14/22 08:16





Date of discharge: 07/18/22


Attending physician: 


AMY J KOCHERLA





Primary care physician: 


PRIMARY CARE MD








Hospitalization


Condition: Stable


Pertinent studies: 





CT neck with contrast widespread cervical spondylosis with multifocal 

neuroforaminal narrowing no indication of cervical fracture or traumatic 

subluxation no indication of major vascular injury


CT head without contrast; chronic changes of atrophy and small vessel ischemia 

small lacunar infarct chronic mild mucosal thickening of maxillary sinuses


Abdominal ultrasound diffusely echogenic and heterogeneous appearance of the 

liver most commonly seen in steatosis


Hyperechoic focus in the gallbladder neck measuring 9 mm could represent 

gallbladder polyp versus nonshadowing stone


No acute abnormality


Hospital course: 


Severe hypokalemia; resolved


Hypomagnesemia; replenished per protocol monitor levels








#Alcohol withdrawal;


Symptoms resolved, no tremulousness or agitation no seizures





#Witnessed seizure;


No new episodes of seizures


Patient ambulating well in the room


Ambulating in the hallway/PT if needed


Alcohol withdrawal symptoms resolved


DC CIWA protocol





#Elevated transaminases; alcoholic liver disease


Hypocalcemia





trending down to near normal


Closely monitor





#Hypocalcemia


 Calcium 8.1


 Repleting.  Will repeat calcium level in the morning.





#Hypomagnesemia


Replenished, normal levels today





#Severe protein caloric malnutrition/hypoalbuminemia


Due to chronic alcoholism, thiamine folic acid and multivitamins


Quit alcohol intake





#Alcohol dependence


- Counseled patient on the importance of ETOH cessation. 


Assess patient's current ETOH consumption. 


Assisted with trying to arrange resources for patient to adequately work towards

ETOH cessation.


Patient advised to seek alcohol rehabilitation upon discharge patient verbalized

understanding


Preventive counseling and medical care 35 minutes





#Advanced care planning


-Disease education conducted, care plan discussed, 


diagnoses discussed, prognosis discussed, and patient acknowledges understanding

with care plaN


-Time: 32








Disposition: 01 HOME / SELF CARE / HOMELESS


Final Discharge Diagnosis (Prints w/discharge instructions): Alcohol withdrawal.

 Witnessed seizure.  Elevated transaminases/alcoholic liver disease.  

Hypocalcemia.  Hypomagnesemia.  Hypokalemia.  Severe protein calorie 

malnutrition.  Hypoalbuminemia.  Chronic alcohol dependence.  Preventive 

counseling


Time spent for discharge: 35 minutes





Core Measure Documentation





- Palliative Care


Palliative Care/ Comfort Measures: Not Applicable





- Core Measures


Any of the following diagnoses?: none





Exam





- Constitutional


Vitals: 


                                        











Temp Pulse Resp BP Pulse Ox


 


 97.1 F L  93 H  20   97/55   96 


 


 07/16/22 18:00  07/16/22 18:00  07/16/22 18:00  07/16/22 18:00  07/16/22 18:00














Plan


Activity: advance as tolerated, fall precautions


Diet: regular


Durable Medical Equipment Needed Upon Discharge: Walker-Rolling


Additional Instructions: Strongly advised to quit alcohol intake.  Seek alcohol 

rehabilitation when he is stable.  If you have worsening symptoms contact MD or 

go to your primary care physician or your nearest emergency room as needed.  

Follow your primary care physician within 7 days.  Physical therapist 

recommended subacute rehab therapy.  Check with your primary care physician to 

set up rehab placement.  Advised to use walker/cane if you have unsteady gait


Follow up with: 


PRIMARY CARE,MD [Primary Care Provider] - 3-5 Days


Prescriptions: 


RX: Folic Acid [Folvite] 1 mg PO QDAY #30 tablet


RX: Multivitamin Tab [Multiple Vitamin TAB (Theragran)] 1 each PO QDAY #30 

tablet


Famotidine [Pepcid] 20 mg PO BID #20 tablet


RX: Thiamine [Vitamin B-1] 100 mg PO QDAY #30 tablet

## 2022-07-17 RX ADMIN — Medication SCH ML: at 21:28

## 2022-07-17 RX ADMIN — ENOXAPARIN SODIUM SCH MG: 100 INJECTION SUBCUTANEOUS at 09:05

## 2022-07-17 RX ADMIN — Medication SCH ML: at 09:05

## 2022-07-17 RX ADMIN — Medication SCH MG: at 09:05

## 2022-07-17 RX ADMIN — FOLIC ACID SCH MG: 1 TABLET ORAL at 09:05

## 2022-07-17 RX ADMIN — MULTIVITAMIN TABLET SCH EACH: TABLET at 09:05

## 2022-07-17 NOTE — ELECTROCARDIOGRAPH REPORT
[facility]

                                       

Test Date:    2022               Test Time:    16:28:06

Pat Name:     SONIA BEYER       Department:   

Patient ID:   [id number]          Room:         A386 1

Gender:       M                        Technician:   [id number]

:          1955               Requested By: NIHARIKA MCKEON

Order Number: [id number]              Reading MD:   Saw Ayala

                                 Measurements

Intervals                              Axis          

Rate:         141                      P:            

GA:                                    QRS:          0

QRSD:         72                       T:            232

QT:           336                                    

QTc:          515                                    

                           Interpretive Statements

Atrial fibrillation

Nonspecific repol abnormality, diffuse leads

Prolonged QT interval

Compared to ECG 2022 23:03:57

Early repolarization now present

Prolonged QT interval now present

Sinus rhythm no longer present

Electronically Signed On 2022 15:06:28 EDT by Saw Ayala

## 2022-07-17 NOTE — PROGRESS NOTE
Assessment and Plan


Assessment and plan: 


Severe hypokalemia; resolved


Hypomagnesemia; replenished per protocol monitor levels





#Alcohol withdrawal;


Symptoms resolved, no tremulousness or agitation no seizures





#Witnessed seizure; alcohol related/ resolved


No new episodes of seizures


Patient ambulating well in the room


Ambulating in the hallway/PT if needed


Alcohol withdrawal symptoms resolved


[facility] protocol





#Elevated transaminases; trending down


trending down to near normal


Closely monitor





#Hypocalcemia stable


 Calcium 8.1


 Repleting.  Will repeat calcium level in the morning.





#Hypomagnesemia/resolved


Replenished, normal levels today





#Severe protein caloric malnutrition/hypoalbuminemia


Due to chronic alcoholism, thiamine folic acid and multivitamins


Quit alcohol intake





#Alcohol dependence/strongly advised to quit alcohol intake


- Counseled patient on the importance of ETOH cessation. 


Assess patient's current ETOH consumption. 


Assisted with trying to arrange resources for patient to adequately work towards

ETOH cessation.


Patient advised to seek alcohol rehabilitation upon discharge patient verbalized

understanding


Preventive counseling and medical care 35 minutes





#Advanced care planning


-Disease education conducted, care plan discussed, 


diagnoses discussed, prognosis discussed, and patient acknowledges understanding

with care plaN


-Time: 32





Advance Directives: No


VTE prophylaxis?: Chemical


Plan of care discussed with patient/family: Yes





Closely monitor the patient and adjust management as needed


Plan of care reviewed with the patient and his nurse


Increase ambulation as tolerated


Possible discharge home tomorrow





Plan of care reviewed with the patient and his nurse





7/16/2022; ambulate as tolerated, [facility] protocol


Possible discharge tomorrow if stable





7/17; initially patient was prepared for discharge


However as he was ambulating he was having severe unsteady gait


And shivering and jerking movements, no family around


Case management trying to contact his friends in [place]


Contacted patient's friend 


Friend is planning to cancel the patient's trip to [place] and is making 

arrangements for him to fly back to [place] tomorrow


We will closely monitor and plan discharge in the morning





Meanwhile case management daughter walker, PT evaluation requested











History


Interval history: 


I have seen and examined the patient at the bedside this morning


Patient's chart and medications reviewed


Initially I was planning to discharge the patient home


However patient complained of unsteady gait,


And mild shaking and shivering


Vital signs are stable


And is patient was traveling to [place]


And patient was not stable we held the discharge


Patient is alert awake oriented x3


No evidence of alcohol withdrawal symptoms


Vital signs noted








Hospitalist Physical





- Constitutional


Vitals: 


                                        











Temp Pulse Resp BP Pulse Ox


 


 97.3 F L  129 H  16   107/68   98 


 


 07/16/22 21:22  07/16/22 21:22  07/16/22 21:22  07/16/22 21:22  07/17/22 10:00











General appearance: Present: no acute distress, well-nourished, other 

(Drowsy/lethargic on exam)





- EENT


Eyes: Present: PERRL, EOM intact





- Neck


Neck: Present: supple, normal ROM





- Respiratory


Respiratory effort: normal


Respiratory: bilateral: diminished, negative: rales, rhonchi, wheezing





- Cardiovascular


Rhythm: regular


Heart Sounds: Present: S1 & S2





- Extremities


Extremities: no ischemia, No edema





- Abdominal


General gastrointestinal: soft, non-tender, non-distended, normal bowel sounds





- Integumentary


Integumentary: Present: clear, warm





- Psychiatric


Psychiatric: appropriate mood/affect, cooperative





- Neurologic


Neurologic: CNII-XII intact, moves all extremities





Results





- Labs


CBC & Chem 7: 


                                 07/15/22 04:39





                                 07/16/22 15:38


Labs: 


                             Laboratory Last Values











WBC  3.9 K/mm3 (4.5-11.0)  L  07/15/22  04:39    


 


RBC  3.33 M/mm3 (3.65-5.03)  L  07/15/22  04:39    


 


Hgb  11.0 gm/dl (11.8-15.2)  L  07/15/22  04:39    


 


Hct  32.7 % (35.5-45.6)  L  07/15/22  04:39    


 


MCV  98 fl (84-94)  H  07/15/22  04:39    


 


MCH  33 pg (28-32)  H  07/15/22  04:39    


 


MCHC  34 % (32-34)   07/15/22  04:39    


 


RDW  14.7 % (13.2-15.2)   07/15/22  04:39    


 


Plt Count  205 K/mm3 (140-440)   07/15/22  04:39    


 


Mono % (Auto)  Np   07/15/22  04:39    


 


Add Manual Diff  Complete   07/15/22  04:39    


 


Total Counted  100   07/15/22  04:39    


 


Seg Neuts % (Manual)  56.0 % (40.0-70.0)   07/15/22  04:39    


 


Band Neutrophils %  0 %  07/15/22  04:39    


 


Lymphocytes % (Manual)  27.0 % (13.4-35.0)   07/15/22  04:39    


 


Reactive Lymphs % (Man)  0 %  07/15/22  04:39    


 


Monocytes % (Manual)  17.0 % (0.0-7.3)  H  07/15/22  04:39    


 


Eosinophils % (Manual)  0 % (0.0-4.3)   07/15/22  04:39    


 


Basophils % (Manual)  0 % (0.0-1.8)   07/15/22  04:39    


 


Metamyelocytes %  0 %  07/15/22  04:39    


 


Myelocytes %  0 %  07/15/22  04:39    


 


Promyelocytes %  0 %  07/15/22  04:39    


 


Blast Cells %  0 %  07/15/22  04:39    


 


Nucleated RBC %  Not Reportable   07/15/22  04:39    


 


Seg Neutrophils # Man  2.2 K/mm3 (1.8-7.7)   07/15/22  04:39    


 


Band Neutrophils #  0.0 K/mm3  07/15/22  04:39    


 


Lymphocytes # (Manual)  1.1 K/mm3 (1.2-5.4)  L  07/15/22  04:39    


 


Abs React Lymphs (Man)  0.0 K/mm3  07/15/22  04:39    


 


Monocytes # (Manual)  0.7 K/mm3 (0.0-0.8)   07/15/22  04:39    


 


Eosinophils # (Manual)  0.0 K/mm3 (0.0-0.4)   07/15/22  04:39    


 


Basophils # (Manual)  0.0 K/mm3 (0.0-0.1)   07/15/22  04:39    


 


Metamyelocytes #  0.0 K/mm3  07/15/22  04:39    


 


Myelocytes #  0.0 K/mm3  07/15/22  04:39    


 


Promyelocytes #  0.0 K/mm3  07/15/22  04:39    


 


Blast Cells #  0.0 K/mm3  07/15/22  04:39    


 


WBC Morphology  Not Reportable   07/15/22  04:39    


 


Hypersegmented Neuts  Not Reportable   07/15/22  04:39    


 


Hyposegmented Neuts  Not Reportable   07/15/22  04:39    


 


Hypogranular Neuts  Not Reportable   07/15/22  04:39    


 


Smudge Cells  Not Reportable   07/15/22  04:39    


 


Toxic Granulation  Not Reportable   07/15/22  04:39    


 


Toxic Vacuolation  Not Reportable   07/15/22  04:39    


 


Dohle Bodies  Not Reportable   07/15/22  04:39    


 


Pelger-Huet Anomaly  Not Reportable   07/15/22  04:39    


 


Titus Rods  Not Reportable   07/15/22  04:39    


 


Platelet Estimate  Consistent w auto   07/15/22  04:39    


 


Clumped Platelets  Not Reportable   07/15/22  04:39    


 


Plt Clumps, EDTA  Not Reportable   07/15/22  04:39    


 


Large Platelets  Not Reportable   07/15/22  04:39    


 


Giant Platelets  Not Reportable   07/15/22  04:39    


 


Platelet Satelliting  Not Reportable   07/15/22  04:39    


 


Plt Morphology Comment  Not Reportable   07/15/22  04:39    


 


RBC Morphology  Not Reportable   07/15/22  04:39    


 


Dimorphic RBCs  Not Reportable   07/15/22  04:39    


 


Polychromasia  Not Reportable   07/15/22  04:39    


 


Hypochromasia  1+   07/15/22  04:39    


 


Poikilocytosis  Not Reportable   07/15/22  04:39    


 


Anisocytosis  1+   07/15/22  04:39    


 


Microcytosis  Not Reportable   07/15/22  04:39    


 


Macrocytosis  Few   07/15/22  04:39    


 


Spherocytes  Not Reportable   07/15/22  04:39    


 


Pappenheimer Bodies  Not Reportable   07/15/22  04:39    


 


Sickle Cells  Not Reportable   07/15/22  04:39    


 


Target Cells  Not Reportable   07/15/22  04:39    


 


Tear Drop Cells  Not Reportable   07/15/22  04:39    


 


Ovalocytes  Not Reportable   07/15/22  04:39    


 


Helmet Cells  Not Reportable   07/15/22  04:39    


 


Mmcahon-Randall Bodies  Not Reportable   07/15/22  04:39    


 


Cabot Rings  Not Reportable   07/15/22  04:39    


 


[place] Cells  Not Reportable   07/15/22  04:39    


 


Bite Cells  Not Reportable   07/15/22  04:39    


 


Crenated Cell  Not Reportable   07/15/22  04:39    


 


Elliptocytes  Not Reportable   07/15/22  04:39    


 


Acanthocytes (Spur)  Not Reportable   07/15/22  04:39    


 


Rouleaux  Not Reportable   07/15/22  04:39    


 


Hemoglobin C Crystals  Not Reportable   07/15/22  04:39    


 


Schistocytes  Not Reportable   07/15/22  04:39    


 


Malaria parasites  Not Reportable   07/15/22  04:39    


 


Marino Bodies  Not Reportable   07/15/22  04:39    


 


Hem Pathologist Commnt  No   07/15/22  04:39    


 


Sodium  143 mmol/L (137-145)  D 07/15/22  04:39    


 


Potassium  3.8 mmol/L (3.6-5.0)  D 07/16/22  15:38    


 


Chloride  105.3 mmol/L ()   07/15/22  04:39    


 


Carbon Dioxide  25 mmol/L (22-30)   07/15/22  04:39    


 


Anion Gap  16 mmol/L  07/15/22  04:39    


 


BUN  4 mg/dL (9-20)  L  07/15/22  04:39    


 


Creatinine  0.8 mg/dL (0.8-1.3)   07/15/22  04:39    


 


Estimated GFR  > 60 ml/min  07/15/22  04:39    


 


BUN/Creatinine Ratio  5 %  07/15/22  04:39    


 


Glucose  79 mg/dL ()   07/15/22  04:39    


 


POC Glucose  77 mg/dL ()   07/14/22  22:24    


 


Calcium  7.6 mg/dL (8.4-10.2)  L  07/15/22  04:39    


 


Phosphorus  3.40 mg/dL (2.5-4.5)  D 07/15/22  04:39    


 


Magnesium  2.30 mg/dL (1.7-2.3)   07/16/22  15:38    


 


Total Bilirubin  0.80 mg/dL (0.1-1.2)   07/15/22  04:39    


 


AST  105 units/L (5-40)  H  07/15/22  04:39    


 


ALT  56 units/L (7-56)   07/15/22  04:39    


 


Alkaline Phosphatase  122 units/L ()   07/15/22  04:39    


 


Total Protein  6.1 g/dL (6.3-8.2)  L  07/15/22  04:39    


 


Albumin  2.6 g/dL (3.9-5)  L  07/15/22  04:39    


 


Albumin/Globulin Ratio  0.7 %  07/15/22  04:39    


 


TSH  2.020 mlU/mL (0.270-4.200)   07/13/22  22:28    


 


Urine Color  Yellow  (Yellow)   07/13/22  Unknown


 


Urine Turbidity  Clear  (Clear)   07/13/22  Unknown


 


Urine pH  6.0  (5.0-7.0)   07/13/22  Unknown


 


Ur Specific Gravity  1.012  (1.003-1.030)   07/13/22  Unknown


 


Urine Protein  30 mg/dl mg/dL (Negative)   07/13/22  Unknown


 


Urine Glucose (UA)  Neg mg/dL (Negative)   07/13/22  Unknown


 


Urine Ketones  Tr mg/dL (Negative)   07/13/22  Unknown


 


Urine Blood  Neg  (Negative)   07/13/22  Unknown


 


Urine Nitrite  Neg  (Negative)   07/13/22  Unknown


 


Urine Bilirubin  Neg  (Negative)   07/13/22  Unknown


 


Urine Urobilinogen  2.0 mg/dL (<2.0)   07/13/22  Unknown


 


Ur Leukocyte Esterase  Neg  (Negative)   07/13/22  Unknown


 


Urine WBC (Auto)  2.0 /HPF (0.0-6.0)   07/13/22  Unknown


 


Urine RBC (Auto)  < 1.0 /HPF (0.0-6.0)   07/13/22  Unknown


 


U Epithel Cells (Auto)  < 1.0 /HPF (0-13.0)   07/13/22  Unknown


 


Urine Opiates Screen  Negative   07/13/22  Unknown


 


Urine Methadone Screen  Negative   07/13/22  Unknown


 


Ur Barbiturates Screen  Negative   07/13/22  Unknown


 


Ur Phencyclidine Scrn  Negative   07/13/22  Unknown


 


Ur Amphetamines Screen  Negative   07/13/22  Unknown


 


U Benzodiazepines Scrn  Negative   07/13/22  Unknown


 


Urine Cocaine Screen  Negative   07/13/22  Unknown


 


U Marijuana (THC) Screen  Negative   07/13/22  Unknown


 


Drugs of Abuse Note  Disclamer   07/13/22  Unknown


 


Plasma/Serum Alcohol  < 0.01 % (0-0.07)   07/13/22  22:28    











Microbiology: 


Microbiology





07/14/22 22:37   Peripheral/Venous   Blood Culture - Preliminary


                            NO GROWTH AFTER 48 HOURS


07/14/22 22:37   Peripheral/Venous   Blood Culture - Preliminary


                            NO GROWTH AFTER 48 HOURS








Jose/IV: 


                                        





Voiding Method                   Condom Catheter











Active Medications





- Current Medications


Current Medications: 














Generic Name Dose Route Start Last Admin





  Trade Name Freq  PRN Reason Stop Dose Admin


 


Acetaminophen  650 mg  07/14/22 09:00 





  Acetaminophen 325 Mg Tab  PO  





  Q4H PRN  





  Pain MILD(1-3)/Fever >100.5/HA  


 


Enoxaparin Sodium  40 mg  07/14/22 10:00  07/17/22 09:05





  Enoxaparin 40 Mg/0.4 Ml Inj  SUB-Q   40 mg





  QDAY@1000 SHIRA   Administration


 


Folic Acid  1 mg  07/15/22 10:00  07/17/22 09:05





  Folic Acid 1 Mg Tab  PO   1 mg





  QDAY SHIRA   Administration


 


Morphine Sulfate  2 mg  07/14/22 09:00 





  Morphine 2 Mg/1 Ml Inj  IV  





  Q4H PRN  





  Pain , Severe (7-10)  


 


Multivitamins  1 each  07/15/22 10:00  07/17/22 09:05





  Multivitamins ,Therapeutic Tab  PO   1 each





  QDAY SHIRA   Administration


 


Ondansetron HCl  4 mg  07/14/22 09:00 





  Ondansetron 4 Mg/2 Ml Inj  IV  





  Q8H PRN  





  Nausea And Vomiting  


 


Oxycodone/Acetaminophen  1 tab  07/14/22 09:00 





  Oxycodone /Acetaminophen 5-325mg Tab  PO  





  Q6H PRN  





  Pain, Moderate (4-6)  


 


Sodium Chloride  10 ml  07/14/22 10:00  07/17/22 09:05





  Sodium Chloride 0.9% 10 Ml Flush Syringe  IV   10 ml





  BID SHIRA   Administration


 


Sodium Chloride  10 ml  07/14/22 09:00 





  Sodium Chloride 0.9% 10 Ml Flush Syringe  IV  





  PRN PRN  





  LINE FLUSH  


 


Thiamine HCl  100 mg  07/15/22 10:00  07/17/22 09:05





  Thiamine 100 Mg Tab  PO   100 mg





  QDAY SHIRA   Administration














Nutrition/Malnutrition Assess





- Dietary Evaluation


Nutrition/Malnutrition Findings: 


                                        





Nutrition Notes                                            Start:  07/15/22 

18:09


Freq:                                                      Status: Active       




Protocol:                                                                       




 Document     07/15/22 18:09  LAUREL  (Rec: 07/15/22 18:28  LAUREL  [id number])


 Nutrition Notes


     Need for Assessment generated from:         MD Order


     Initial or Follow up                        Assessment


     Current Diagnosis                           Hypertension,Malnutrition,


                                                 Hyperlipidemia


     Other Pertinent Diagnosis                   PVD, Transaminitis, Seizure,


                                                 EtOH Dependence, Hypokalemia.


     Current Diet                                Regular Diet (since L 07/14).


     Labs/Tests                                  07/15: K 2.8, BUN 4, Ca 7.6,


                                                 Mg 1.2.


     Pertinent Medications                       07/15: D5w @ 75 ml/hr, Folic


                                                 acid, Multivitamins, Thiamine,


                                                 others nutritionally


                                                 unremarkable.


     Height                                      5 ft 8 in


     Weight                                      72.7 kg


     Ideal Body Weight (kg)                      70.00


     BMI                                         24.3


     Intake Prior to Admission                   Good


     Weight change and time frame                Pt denies having loss body


                                                 weight PTA.


     Weight Status                               Appropriate


     Subjective/Other Information                RD consult for dietary


                                                 supplementation assessment.


                                                 No reports available on Pt's


                                                 PO intake of meals at the time


                                                 , will assess at F/U.


                                                 I will not prescribe dietary


                                                 supplements at this time since


                                                 Pt seems to be


                                                 Encephalopathic and I strongly


                                                 recommend hold for Pt's


                                                 recovery and SLP evaluation/


                                                 recommendations.


                                                 Pt is on Room Air, O2


                                                 saturation @ 96%, according to


                                                 Physical Assessment History


                                                 notes.


                                                 Pt presents unspecified


                                                 bruises as signs of concern


                                                 for skin risk at the time,


                                                 according to Physical


                                                 Assessment History notes.


     Percent of energy/protein needs met:        Prescribed Regular Diet


                                                 provides for energy/protein


                                                 needs (2,289 Kcal/89 g) during


                                                 LOS.


     Burn                                        Absent


     Trauma                                      Absent


     GI Symptoms                                 None


     Food Allergy                                No


     Skin Integrity/Comment                      Unspecified Bruises.


     Current % PO                                Other


     Minimum of two criteria                     No


     Fluid Accumulation                          N/A


     Reduced  Strength                       N/A (non-severe)


     Protein-Calorie Malnutrition                N\A


     #1


      Nutrition Diagnosis                        No nutrition diagnosis at this


                                                 time


      Comments:                                  Will assess Pt's PO intake of


                                                 meals and SLP recommendations


                                                 at F/U


     Is patient on ventilator?                   No


     Is Patient Ambulatory and/or Out of Bed     No


     REE-([facility]-confined to bed)      [phone]


     Kcal/Kg value to use for calculation        25


     Approximate Energy Requirements Using       1818


      kcal/Kg                                    


     Calculation Used for Recommendations        Kcal/kg


     Additional Notes                            Protein: 1-1.2 g/Kg ABW; 73-88


                                                 g/day.


                                                 Fluids: 1 ml/Kcal, or as per


                                                 MD.


 Nutrition Intervention


     Change Diet Order:                          Continue Regular Diet as


                                                 tolerated.


     Follow-Up By:                               07/19/22


     Additional Comments                         Continue monitoring food


                                                 tolerance, %PO intake of meals


                                                 , and BM.

## 2022-07-18 VITALS — SYSTOLIC BLOOD PRESSURE: 94 MMHG | DIASTOLIC BLOOD PRESSURE: 41 MMHG

## 2022-07-18 RX ADMIN — Medication SCH ML: at 09:49

## 2022-07-18 RX ADMIN — MULTIVITAMIN TABLET SCH EACH: TABLET at 09:48

## 2022-07-18 RX ADMIN — Medication SCH MG: at 09:48

## 2022-07-18 RX ADMIN — ENOXAPARIN SODIUM SCH MG: 100 INJECTION SUBCUTANEOUS at 09:48

## 2022-07-18 RX ADMIN — FOLIC ACID SCH MG: 1 TABLET ORAL at 09:48
